# Patient Record
Sex: FEMALE | Race: OTHER | NOT HISPANIC OR LATINO | URBAN - METROPOLITAN AREA
[De-identification: names, ages, dates, MRNs, and addresses within clinical notes are randomized per-mention and may not be internally consistent; named-entity substitution may affect disease eponyms.]

---

## 2017-01-15 ENCOUNTER — EMERGENCY (EMERGENCY)
Facility: HOSPITAL | Age: 22
LOS: 1 days | Discharge: HOME | End: 2017-01-15
Admitting: EMERGENCY MEDICINE
Payer: MEDICAID

## 2017-01-15 VITALS
OXYGEN SATURATION: 100 % | RESPIRATION RATE: 16 BRPM | TEMPERATURE: 98 F | DIASTOLIC BLOOD PRESSURE: 51 MMHG | SYSTOLIC BLOOD PRESSURE: 109 MMHG | HEART RATE: 64 BPM

## 2017-01-15 VITALS
OXYGEN SATURATION: 100 % | DIASTOLIC BLOOD PRESSURE: 63 MMHG | RESPIRATION RATE: 16 BRPM | SYSTOLIC BLOOD PRESSURE: 103 MMHG | HEART RATE: 67 BPM | TEMPERATURE: 98 F

## 2017-01-15 LAB
ALBUMIN SERPL ELPH-MCNC: 4.5 G/DL — SIGNIFICANT CHANGE UP (ref 3.3–5)
ALP SERPL-CCNC: 42 U/L — SIGNIFICANT CHANGE UP (ref 40–120)
ALT FLD-CCNC: 46 U/L — HIGH (ref 4–33)
APPEARANCE UR: CLEAR — SIGNIFICANT CHANGE UP
AST SERPL-CCNC: 32 U/L — SIGNIFICANT CHANGE UP (ref 4–32)
BASOPHILS # BLD AUTO: 0.02 K/UL — SIGNIFICANT CHANGE UP (ref 0–0.2)
BASOPHILS NFR BLD AUTO: 0.2 % — SIGNIFICANT CHANGE UP (ref 0–2)
BILIRUB SERPL-MCNC: 0.5 MG/DL — SIGNIFICANT CHANGE UP (ref 0.2–1.2)
BILIRUB UR-MCNC: NEGATIVE — SIGNIFICANT CHANGE UP
BLOOD UR QL VISUAL: NEGATIVE — SIGNIFICANT CHANGE UP
BUN SERPL-MCNC: 14 MG/DL — SIGNIFICANT CHANGE UP (ref 7–23)
CALCIUM SERPL-MCNC: 9.5 MG/DL — SIGNIFICANT CHANGE UP (ref 8.4–10.5)
CHLORIDE SERPL-SCNC: 103 MMOL/L — SIGNIFICANT CHANGE UP (ref 98–107)
CO2 SERPL-SCNC: 21 MMOL/L — LOW (ref 22–31)
COLOR SPEC: YELLOW — SIGNIFICANT CHANGE UP
CREAT SERPL-MCNC: 0.71 MG/DL — SIGNIFICANT CHANGE UP (ref 0.5–1.3)
EOSINOPHIL # BLD AUTO: 0.01 K/UL — SIGNIFICANT CHANGE UP (ref 0–0.5)
EOSINOPHIL NFR BLD AUTO: 0.1 % — SIGNIFICANT CHANGE UP (ref 0–6)
GLUCOSE SERPL-MCNC: 93 MG/DL — SIGNIFICANT CHANGE UP (ref 70–99)
GLUCOSE UR-MCNC: NEGATIVE — SIGNIFICANT CHANGE UP
HCT VFR BLD CALC: 40.1 % — SIGNIFICANT CHANGE UP (ref 34.5–45)
HGB BLD-MCNC: 13.6 G/DL — SIGNIFICANT CHANGE UP (ref 11.5–15.5)
IMM GRANULOCYTES NFR BLD AUTO: 0.2 % — SIGNIFICANT CHANGE UP (ref 0–1.5)
KETONES UR-MCNC: NEGATIVE — SIGNIFICANT CHANGE UP
LEUKOCYTE ESTERASE UR-ACNC: NEGATIVE — SIGNIFICANT CHANGE UP
LYMPHOCYTES # BLD AUTO: 1.91 K/UL — SIGNIFICANT CHANGE UP (ref 1–3.3)
LYMPHOCYTES # BLD AUTO: 19.6 % — SIGNIFICANT CHANGE UP (ref 13–44)
MCHC RBC-ENTMCNC: 31 PG — SIGNIFICANT CHANGE UP (ref 27–34)
MCHC RBC-ENTMCNC: 33.9 % — SIGNIFICANT CHANGE UP (ref 32–36)
MCV RBC AUTO: 91.3 FL — SIGNIFICANT CHANGE UP (ref 80–100)
MONOCYTES # BLD AUTO: 0.32 K/UL — SIGNIFICANT CHANGE UP (ref 0–0.9)
MONOCYTES NFR BLD AUTO: 3.3 % — SIGNIFICANT CHANGE UP (ref 2–14)
NEUTROPHILS # BLD AUTO: 7.48 K/UL — HIGH (ref 1.8–7.4)
NEUTROPHILS NFR BLD AUTO: 76.6 % — SIGNIFICANT CHANGE UP (ref 43–77)
NITRITE UR-MCNC: NEGATIVE — SIGNIFICANT CHANGE UP
PH UR: 8 — SIGNIFICANT CHANGE UP (ref 4.6–8)
PLATELET # BLD AUTO: 234 K/UL — SIGNIFICANT CHANGE UP (ref 150–400)
PMV BLD: 11.9 FL — SIGNIFICANT CHANGE UP (ref 7–13)
POTASSIUM SERPL-MCNC: 4.3 MMOL/L — SIGNIFICANT CHANGE UP (ref 3.5–5.3)
POTASSIUM SERPL-SCNC: 4.3 MMOL/L — SIGNIFICANT CHANGE UP (ref 3.5–5.3)
PROT SERPL-MCNC: 8 G/DL — SIGNIFICANT CHANGE UP (ref 6–8.3)
PROT UR-MCNC: 20 — SIGNIFICANT CHANGE UP
RBC # BLD: 4.39 M/UL — SIGNIFICANT CHANGE UP (ref 3.8–5.2)
RBC # FLD: 12.4 % — SIGNIFICANT CHANGE UP (ref 10.3–14.5)
SODIUM SERPL-SCNC: 139 MMOL/L — SIGNIFICANT CHANGE UP (ref 135–145)
SP GR SPEC: 1.02 — SIGNIFICANT CHANGE UP (ref 1–1.03)
UROBILINOGEN FLD QL: NORMAL E.U. — SIGNIFICANT CHANGE UP (ref 0.1–0.2)
WBC # BLD: 9.76 K/UL — SIGNIFICANT CHANGE UP (ref 3.8–10.5)
WBC # FLD AUTO: 9.76 K/UL — SIGNIFICANT CHANGE UP (ref 3.8–10.5)
WBC UR QL: SIGNIFICANT CHANGE UP (ref 0–?)

## 2017-01-15 PROCEDURE — 99284 EMERGENCY DEPT VISIT MOD MDM: CPT

## 2017-01-15 RX ORDER — SODIUM CHLORIDE 9 MG/ML
1000 INJECTION INTRAMUSCULAR; INTRAVENOUS; SUBCUTANEOUS ONCE
Qty: 0 | Refills: 0 | Status: COMPLETED | OUTPATIENT
Start: 2017-01-15 | End: 2017-01-15

## 2017-01-15 RX ORDER — ONDANSETRON 8 MG/1
4 TABLET, FILM COATED ORAL ONCE
Qty: 0 | Refills: 0 | Status: COMPLETED | OUTPATIENT
Start: 2017-01-15 | End: 2017-01-15

## 2017-01-15 RX ORDER — FAMOTIDINE 10 MG/ML
20 INJECTION INTRAVENOUS ONCE
Qty: 0 | Refills: 0 | Status: COMPLETED | OUTPATIENT
Start: 2017-01-15 | End: 2017-01-15

## 2017-01-15 RX ORDER — ONDANSETRON 8 MG/1
1 TABLET, FILM COATED ORAL
Qty: 12 | Refills: 0 | OUTPATIENT
Start: 2017-01-15 | End: 2017-01-18

## 2017-01-15 RX ADMIN — SODIUM CHLORIDE 2000 MILLILITER(S): 9 INJECTION INTRAMUSCULAR; INTRAVENOUS; SUBCUTANEOUS at 15:40

## 2017-01-15 RX ADMIN — FAMOTIDINE 20 MILLIGRAM(S): 10 INJECTION INTRAVENOUS at 15:43

## 2017-01-15 RX ADMIN — ONDANSETRON 4 MILLIGRAM(S): 8 TABLET, FILM COATED ORAL at 15:43

## 2017-01-15 NOTE — ED ADULT NURSE REASSESSMENT NOTE - NS ED NURSE REASSESS COMMENT FT1
Patient returned back to board, states she was in bathroom when she was being called.  Patient was a no answer at [id number], and [phone]

## 2017-01-15 NOTE — ED PROVIDER NOTE - CARE PLAN
Principal Discharge DX:	Gastroenteritis  Instructions for follow-up, activity and diet:	Zofran for nausea as directed 30 min before meals  Drink plenty of fluids and stay hydrated  Follow up with your doctor in the next 24-48 hours   If you develop worsening or emergent concerns as discussed return to the ER

## 2017-01-15 NOTE — ED PROVIDER NOTE - OBJECTIVE STATEMENT
20 yo F p/w nausea, vomiting and diarrhea x 1 day with 20 yo F p/w nausea, vomiting and diarrhea x 1 day without some mid abdominal burning s/p vomiting. Patient works in a [employer] and exposed to many people a day. Woke up this morning unable to tolerate any PO solids or liquids. Vomit non bloody/non bilious x 7 episodes, diarrhea, non bloody x 6 episodes. No known sick contacts. Denies fever, chills, throat/ear pain, chest pain, SOB, dysuria/frequency or other urinary symptoms, back pain. Appears well with boyfriend at bedside. LMP: 2 weeks ago, denies pregnancy

## 2017-01-15 NOTE — ED PROVIDER NOTE - PROGRESS NOTE DETAILS
CHRIS Montes De Oca: PO challenge successful without nausea, vomiting or pain, no BM in ED. patient feels better and wants to be dc

## 2017-01-15 NOTE — ED PROVIDER NOTE - PLAN OF CARE
Zofran for nausea as directed 30 min before meals  Drink plenty of fluids and stay hydrated  Follow up with your doctor in the next 24-48 hours   If you develop worsening or emergent concerns as discussed return to the ER

## 2018-04-21 ENCOUNTER — EMERGENCY (EMERGENCY)
Facility: HOSPITAL | Age: 23
LOS: 1 days | Discharge: HOME | End: 2018-04-21
Attending: EMERGENCY MEDICINE | Admitting: EMERGENCY MEDICINE
Payer: MEDICAID

## 2018-04-21 VITALS
TEMPERATURE: 98 F | DIASTOLIC BLOOD PRESSURE: 70 MMHG | SYSTOLIC BLOOD PRESSURE: 120 MMHG | OXYGEN SATURATION: 100 % | RESPIRATION RATE: 16 BRPM | HEART RATE: 79 BPM

## 2018-04-21 LAB
ALBUMIN SERPL ELPH-MCNC: 4.5 G/DL — SIGNIFICANT CHANGE UP (ref 3.3–5)
ALP SERPL-CCNC: 59 U/L — SIGNIFICANT CHANGE UP (ref 40–120)
ALT FLD-CCNC: 22 U/L — SIGNIFICANT CHANGE UP (ref 4–33)
APPEARANCE UR: CLEAR — SIGNIFICANT CHANGE UP
AST SERPL-CCNC: 19 U/L — SIGNIFICANT CHANGE UP (ref 4–32)
BASOPHILS # BLD AUTO: 0.04 K/UL — SIGNIFICANT CHANGE UP (ref 0–0.2)
BASOPHILS NFR BLD AUTO: 0.3 % — SIGNIFICANT CHANGE UP (ref 0–2)
BILIRUB SERPL-MCNC: 0.5 MG/DL — SIGNIFICANT CHANGE UP (ref 0.2–1.2)
BILIRUB UR-MCNC: NEGATIVE — SIGNIFICANT CHANGE UP
BLOOD UR QL VISUAL: HIGH
BUN SERPL-MCNC: 11 MG/DL — SIGNIFICANT CHANGE UP (ref 7–23)
CALCIUM SERPL-MCNC: 9.7 MG/DL — SIGNIFICANT CHANGE UP (ref 8.4–10.5)
CHLORIDE SERPL-SCNC: 102 MMOL/L — SIGNIFICANT CHANGE UP (ref 98–107)
CO2 SERPL-SCNC: 24 MMOL/L — SIGNIFICANT CHANGE UP (ref 22–31)
COLOR SPEC: YELLOW — SIGNIFICANT CHANGE UP
CREAT SERPL-MCNC: 0.66 MG/DL — SIGNIFICANT CHANGE UP (ref 0.5–1.3)
EOSINOPHIL # BLD AUTO: 0 K/UL — SIGNIFICANT CHANGE UP (ref 0–0.5)
EOSINOPHIL NFR BLD AUTO: 0 % — SIGNIFICANT CHANGE UP (ref 0–6)
GLUCOSE SERPL-MCNC: 104 MG/DL — HIGH (ref 70–99)
GLUCOSE UR-MCNC: NEGATIVE — SIGNIFICANT CHANGE UP
HCG SERPL-ACNC: < 5 MIU/ML — SIGNIFICANT CHANGE UP
HCT VFR BLD CALC: 38.9 % — SIGNIFICANT CHANGE UP (ref 34.5–45)
HGB BLD-MCNC: 12.8 G/DL — SIGNIFICANT CHANGE UP (ref 11.5–15.5)
IMM GRANULOCYTES # BLD AUTO: 0.05 # — SIGNIFICANT CHANGE UP
IMM GRANULOCYTES NFR BLD AUTO: 0.4 % — SIGNIFICANT CHANGE UP (ref 0–1.5)
KETONES UR-MCNC: SIGNIFICANT CHANGE UP
LEUKOCYTE ESTERASE UR-ACNC: NEGATIVE — SIGNIFICANT CHANGE UP
LYMPHOCYTES # BLD AUTO: 1.62 K/UL — SIGNIFICANT CHANGE UP (ref 1–3.3)
LYMPHOCYTES # BLD AUTO: 12.9 % — LOW (ref 13–44)
MCHC RBC-ENTMCNC: 30.3 PG — SIGNIFICANT CHANGE UP (ref 27–34)
MCHC RBC-ENTMCNC: 32.9 % — SIGNIFICANT CHANGE UP (ref 32–36)
MCV RBC AUTO: 92 FL — SIGNIFICANT CHANGE UP (ref 80–100)
MONOCYTES # BLD AUTO: 0.49 K/UL — SIGNIFICANT CHANGE UP (ref 0–0.9)
MONOCYTES NFR BLD AUTO: 3.9 % — SIGNIFICANT CHANGE UP (ref 2–14)
MUCOUS THREADS # UR AUTO: SIGNIFICANT CHANGE UP
NEUTROPHILS # BLD AUTO: 10.31 K/UL — HIGH (ref 1.8–7.4)
NEUTROPHILS NFR BLD AUTO: 82.5 % — HIGH (ref 43–77)
NITRITE UR-MCNC: NEGATIVE — SIGNIFICANT CHANGE UP
NRBC # FLD: 0 — SIGNIFICANT CHANGE UP
PH UR: 8 — SIGNIFICANT CHANGE UP (ref 4.6–8)
PLATELET # BLD AUTO: 260 K/UL — SIGNIFICANT CHANGE UP (ref 150–400)
PMV BLD: 11.9 FL — SIGNIFICANT CHANGE UP (ref 7–13)
POTASSIUM SERPL-MCNC: 4.2 MMOL/L — SIGNIFICANT CHANGE UP (ref 3.5–5.3)
POTASSIUM SERPL-SCNC: 4.2 MMOL/L — SIGNIFICANT CHANGE UP (ref 3.5–5.3)
PROT SERPL-MCNC: 8.2 G/DL — SIGNIFICANT CHANGE UP (ref 6–8.3)
PROT UR-MCNC: 100 MG/DL — HIGH
RBC # BLD: 4.23 M/UL — SIGNIFICANT CHANGE UP (ref 3.8–5.2)
RBC # FLD: 12.3 % — SIGNIFICANT CHANGE UP (ref 10.3–14.5)
RBC CASTS # UR COMP ASSIST: HIGH (ref 0–?)
SODIUM SERPL-SCNC: 139 MMOL/L — SIGNIFICANT CHANGE UP (ref 135–145)
SP GR SPEC: 1.02 — SIGNIFICANT CHANGE UP (ref 1–1.04)
SQUAMOUS # UR AUTO: SIGNIFICANT CHANGE UP
TSH SERPL-MCNC: 0.37 UIU/ML — SIGNIFICANT CHANGE UP (ref 0.27–4.2)
UROBILINOGEN FLD QL: NORMAL MG/DL — SIGNIFICANT CHANGE UP
WBC # BLD: 12.51 K/UL — HIGH (ref 3.8–10.5)
WBC # FLD AUTO: 12.51 K/UL — HIGH (ref 3.8–10.5)
WBC UR QL: SIGNIFICANT CHANGE UP (ref 0–?)

## 2018-04-21 PROCEDURE — 99284 EMERGENCY DEPT VISIT MOD MDM: CPT

## 2018-04-21 RX ORDER — METOCLOPRAMIDE HCL 10 MG
10 TABLET ORAL ONCE
Qty: 0 | Refills: 0 | Status: COMPLETED | OUTPATIENT
Start: 2018-04-21 | End: 2018-04-21

## 2018-04-21 RX ORDER — METOCLOPRAMIDE HCL 10 MG
1 TABLET ORAL
Qty: 12 | Refills: 0 | OUTPATIENT
Start: 2018-04-21 | End: 2018-04-23

## 2018-04-21 RX ORDER — SODIUM CHLORIDE 9 MG/ML
1000 INJECTION INTRAMUSCULAR; INTRAVENOUS; SUBCUTANEOUS ONCE
Qty: 0 | Refills: 0 | Status: COMPLETED | OUTPATIENT
Start: 2018-04-21 | End: 2018-04-21

## 2018-04-21 RX ORDER — ACETAMINOPHEN 500 MG
650 TABLET ORAL ONCE
Qty: 0 | Refills: 0 | Status: COMPLETED | OUTPATIENT
Start: 2018-04-21 | End: 2018-04-21

## 2018-04-21 RX ADMIN — SODIUM CHLORIDE 1000 MILLILITER(S): 9 INJECTION INTRAMUSCULAR; INTRAVENOUS; SUBCUTANEOUS at 18:44

## 2018-04-21 RX ADMIN — Medication 650 MILLIGRAM(S): at 18:44

## 2018-04-21 RX ADMIN — Medication 10 MILLIGRAM(S): at 18:44

## 2018-04-21 NOTE — ED PROVIDER NOTE - OBJECTIVE STATEMENT
22F p/w posterior headache, woke up with it, it improved throughout the day.  Pt later developed nausea and vomiting, with some blood in it.  Tried drinking water and eating rice, vomited it.  USOH yesterday.  Pt having her period now.  Pt didn't really sleep last night, came home late, but she was "sober".  Pt also had a HA last week.  Feels similar to "migraines", does not carry a dx of migraines.  No photophobia.  No neck pain.  Notes some abd discomfort as well.  No fever.  No dysuria.  No diarrhea.    PMHX remote hx of sz (last 2006)  PSHX bilat breast biopsies.    no T 22F p/w posterior headache, woke up with it, it improved throughout the day.  Pt later developed nausea and vomiting, with some blood in it.  Tried drinking water and eating rice, vomited it.  USOH yesterday.  Pt having her period now.  Pt didn't really sleep last night, came home late, but she was "sober".  Pt also had a HA last week.  Feels similar to "migraines", does not carry a dx of migraines.  No photophobia.  No neck pain.  Notes some abd discomfort as well.  No fever.  No dysuria.  No diarrhea.  Will check pregnancy test.  Well appearing, low concern for ICH or meningitis except for the vomiting.  Pt offered CT but declining at this point, will think about it.  Rx reglan and fluids, check labs, reassess.  Normal neuro exam.    PMHX remote hx of drew (last 2006)  PSHX bilat breast biopsies.    no T

## 2018-04-21 NOTE — ED PROVIDER NOTE - ATTENDING CONTRIBUTION TO CARE
22F p/w posterior headache, woke up with it, it improved throughout the day.  Pt later developed nausea and vomiting, with some blood in it.  Tried drinking water and eating rice, vomited it.  USOH yesterday.  Pt having her period now.  Pt didn't really sleep last night, came home late, but she was "sober".  Pt also had a HA last week.  Feels similar to "migraines", does not carry a dx of migraines.  No photophobia.  No neck pain.  Notes some abd discomfort as well.  No fever.  No dysuria.  No diarrhea.  Will check pregnancy test.  Well appearing, low concern for ICH or meningitis except for the vomiting.  Pt offered CT but declining at this point, will think about it.  Rx reglan and fluids, check labs, reassess.  Normal neuro exam.    PMHX remote hx of sz (last 2006)  PSHX bilat breast biopsies.    no T  VS:  unremarkable    GEN - NAD; well appearing; A+O x3   HEAD - NC/AT     ENT - PEERL, EOMI, mucous membranes  moist , no discharge      NECK: Neck supple, non-tender without lymphadenopathy, no masses, no JVD  PULM - CTA b/l,  symmetric breath sounds  COR -  normal heart sounds    ABD - , ND, NT, soft, no guarding, no rebound, no masses    BACK - no CVA tenderness, nontender spine     EXTREMS - no edema, no deformity, warm and well perfused    SKIN - no rash or bruising      NEUROLOGIC - alert, CN 2-12 intact, sensation nl, motor 5/5 RUE/LUE/RLE/LLE.

## 2018-04-21 NOTE — ED PROVIDER NOTE - PLAN OF CARE
Please follow-up with your primary care doctor in the next 24-48 hours   Please follow-up with your neurologist in the next 1-2 weeks   If you have any severe headache with fever, are unable to move your neck or have changes in vison please return to the emergency department

## 2018-04-21 NOTE — ED PROVIDER NOTE - CARE PLAN
Assessment and plan of treatment:	Please follow-up with your primary care doctor in the next 24-48 hours   Please follow-up with your neurologist in the next 1-2 weeks   If you have any severe headache with fever, are unable to move your neck or have changes in vison please return to the emergency department Principal Discharge DX:	Migraine  Assessment and plan of treatment:	Please follow-up with your primary care doctor in the next 24-48 hours   Please follow-up with your neurologist in the next 1-2 weeks   If you have any severe headache with fever, are unable to move your neck or have changes in vison please return to the emergency department

## 2018-04-21 NOTE — ED PROVIDER NOTE - PHYSICAL EXAMINATION
General: well appearing female in no acute distress   HEENT: no nuchal rigidity, EOMI  Respiratory: normal work of breathing, lungs clear to auscultation bilaterally   Cardiac: regular rate and rhythm   Abdomen: soft, non-tender   Neuro: A&Ox3, cranial nerves II-XII intact, 5/5 strength in all extremities, no sensory deficits

## 2018-04-21 NOTE — ED PROVIDER NOTE - MEDICAL DECISION MAKING DETAILS
22F, PMH of seizure presenting with headache, nausea and vomiting. had similar episode two weeks ago. low concern for meningitis, intracranial hemorrhage 2/2 previous episode, afebrile, no nuchal rigidity, well appearing. concern for tension vs migraine headache. plan for cbc, cmp, ua, hcg, reglan, tylenol. will reassess need for CT.

## 2018-04-21 NOTE — ED ADULT TRIAGE NOTE - CHIEF COMPLAINT QUOTE
c/o of nausea, vomiting and having a headache since this morning.  Pt also c/o of abdominal  fullness.  PMH Seizure 2006.  no medication taken.

## 2018-07-08 ENCOUNTER — EMERGENCY (EMERGENCY)
Facility: HOSPITAL | Age: 23
LOS: 1 days | Discharge: HOME | End: 2018-07-08
Attending: EMERGENCY MEDICINE | Admitting: EMERGENCY MEDICINE
Payer: MEDICAID

## 2018-07-08 VITALS
SYSTOLIC BLOOD PRESSURE: 107 MMHG | RESPIRATION RATE: 16 BRPM | DIASTOLIC BLOOD PRESSURE: 59 MMHG | TEMPERATURE: 99 F | HEART RATE: 59 BPM | OXYGEN SATURATION: 100 %

## 2018-07-08 VITALS
TEMPERATURE: 98 F | OXYGEN SATURATION: 100 % | DIASTOLIC BLOOD PRESSURE: 67 MMHG | SYSTOLIC BLOOD PRESSURE: 122 MMHG | HEART RATE: 72 BPM | RESPIRATION RATE: 16 BRPM

## 2018-07-08 LAB
ALBUMIN SERPL ELPH-MCNC: 4.3 G/DL — SIGNIFICANT CHANGE UP (ref 3.3–5)
ALP SERPL-CCNC: 58 U/L — SIGNIFICANT CHANGE UP (ref 40–120)
ALT FLD-CCNC: 24 U/L — SIGNIFICANT CHANGE UP (ref 4–33)
AST SERPL-CCNC: 31 U/L — SIGNIFICANT CHANGE UP (ref 4–32)
BASOPHILS # BLD AUTO: 0.05 K/UL — SIGNIFICANT CHANGE UP (ref 0–0.2)
BASOPHILS NFR BLD AUTO: 0.5 % — SIGNIFICANT CHANGE UP (ref 0–2)
BILIRUB SERPL-MCNC: 0.3 MG/DL — SIGNIFICANT CHANGE UP (ref 0.2–1.2)
BUN SERPL-MCNC: 15 MG/DL — SIGNIFICANT CHANGE UP (ref 7–23)
CALCIUM SERPL-MCNC: 9.6 MG/DL — SIGNIFICANT CHANGE UP (ref 8.4–10.5)
CHLORIDE SERPL-SCNC: 103 MMOL/L — SIGNIFICANT CHANGE UP (ref 98–107)
CO2 SERPL-SCNC: 22 MMOL/L — SIGNIFICANT CHANGE UP (ref 22–31)
CREAT SERPL-MCNC: 0.8 MG/DL — SIGNIFICANT CHANGE UP (ref 0.5–1.3)
EOSINOPHIL # BLD AUTO: 0.12 K/UL — SIGNIFICANT CHANGE UP (ref 0–0.5)
EOSINOPHIL NFR BLD AUTO: 1.1 % — SIGNIFICANT CHANGE UP (ref 0–6)
GLUCOSE SERPL-MCNC: 75 MG/DL — SIGNIFICANT CHANGE UP (ref 70–99)
HCT VFR BLD CALC: 39.7 % — SIGNIFICANT CHANGE UP (ref 34.5–45)
HGB BLD-MCNC: 13.1 G/DL — SIGNIFICANT CHANGE UP (ref 11.5–15.5)
IMM GRANULOCYTES # BLD AUTO: 0.04 # — SIGNIFICANT CHANGE UP
IMM GRANULOCYTES NFR BLD AUTO: 0.4 % — SIGNIFICANT CHANGE UP (ref 0–1.5)
LYMPHOCYTES # BLD AUTO: 3.44 K/UL — HIGH (ref 1–3.3)
LYMPHOCYTES # BLD AUTO: 32.9 % — SIGNIFICANT CHANGE UP (ref 13–44)
MAGNESIUM SERPL-MCNC: 2.1 MG/DL — SIGNIFICANT CHANGE UP (ref 1.6–2.6)
MCHC RBC-ENTMCNC: 29.9 PG — SIGNIFICANT CHANGE UP (ref 27–34)
MCHC RBC-ENTMCNC: 33 % — SIGNIFICANT CHANGE UP (ref 32–36)
MCV RBC AUTO: 90.6 FL — SIGNIFICANT CHANGE UP (ref 80–100)
MONOCYTES # BLD AUTO: 0.76 K/UL — SIGNIFICANT CHANGE UP (ref 0–0.9)
MONOCYTES NFR BLD AUTO: 7.3 % — SIGNIFICANT CHANGE UP (ref 2–14)
NEUTROPHILS # BLD AUTO: 6.04 K/UL — SIGNIFICANT CHANGE UP (ref 1.8–7.4)
NEUTROPHILS NFR BLD AUTO: 57.8 % — SIGNIFICANT CHANGE UP (ref 43–77)
NRBC # FLD: 0 — SIGNIFICANT CHANGE UP
PHOSPHATE SERPL-MCNC: 3.2 MG/DL — SIGNIFICANT CHANGE UP (ref 2.5–4.5)
PLATELET # BLD AUTO: 248 K/UL — SIGNIFICANT CHANGE UP (ref 150–400)
PMV BLD: 11.9 FL — SIGNIFICANT CHANGE UP (ref 7–13)
POTASSIUM SERPL-MCNC: 4.7 MMOL/L — SIGNIFICANT CHANGE UP (ref 3.5–5.3)
POTASSIUM SERPL-SCNC: 4.7 MMOL/L — SIGNIFICANT CHANGE UP (ref 3.5–5.3)
PROT SERPL-MCNC: 8.1 G/DL — SIGNIFICANT CHANGE UP (ref 6–8.3)
RBC # BLD: 4.38 M/UL — SIGNIFICANT CHANGE UP (ref 3.8–5.2)
RBC # FLD: 12.6 % — SIGNIFICANT CHANGE UP (ref 10.3–14.5)
SODIUM SERPL-SCNC: 137 MMOL/L — SIGNIFICANT CHANGE UP (ref 135–145)
TSH SERPL-MCNC: 0.77 UIU/ML — SIGNIFICANT CHANGE UP (ref 0.27–4.2)
VIT B12 SERPL-MCNC: 1307 PG/ML — HIGH (ref 200–900)
WBC # BLD: 10.45 K/UL — SIGNIFICANT CHANGE UP (ref 3.8–10.5)
WBC # FLD AUTO: 10.45 K/UL — SIGNIFICANT CHANGE UP (ref 3.8–10.5)

## 2018-07-08 PROCEDURE — 70553 MRI BRAIN STEM W/O & W/DYE: CPT | Mod: 26

## 2018-07-08 PROCEDURE — 99285 EMERGENCY DEPT VISIT HI MDM: CPT

## 2018-07-08 RX ORDER — METOCLOPRAMIDE HCL 10 MG
10 TABLET ORAL ONCE
Qty: 0 | Refills: 0 | Status: COMPLETED | OUTPATIENT
Start: 2018-07-08 | End: 2018-07-08

## 2018-07-08 RX ORDER — SODIUM CHLORIDE 9 MG/ML
1000 INJECTION INTRAMUSCULAR; INTRAVENOUS; SUBCUTANEOUS ONCE
Qty: 0 | Refills: 0 | Status: COMPLETED | OUTPATIENT
Start: 2018-07-08 | End: 2018-07-08

## 2018-07-08 RX ORDER — ACETAMINOPHEN 500 MG
1000 TABLET ORAL ONCE
Qty: 0 | Refills: 0 | Status: COMPLETED | OUTPATIENT
Start: 2018-07-08 | End: 2018-07-08

## 2018-07-08 RX ADMIN — Medication 10 MILLIGRAM(S): at 15:26

## 2018-07-08 RX ADMIN — Medication 400 MILLIGRAM(S): at 15:27

## 2018-07-08 RX ADMIN — SODIUM CHLORIDE 1000 MILLILITER(S): 9 INJECTION INTRAMUSCULAR; INTRAVENOUS; SUBCUTANEOUS at 15:26

## 2018-07-08 NOTE — ED ADULT NURSE REASSESSMENT NOTE - SYMPTOMS
dizzyness resolved, states she still has some "pressure' in occipital area of head but denies pain. vs as documented

## 2018-07-08 NOTE — ED PROVIDER NOTE - PLAN OF CARE
Follow-up with Neurology and PMD You were seen at [facility] and had normal lab work and an MRI which showed......You are to follow-up with Neurology and your Primary care doctor for further management. You should take the medications as prescribed. You should return for any worsening symptoms or one sided paralysis.

## 2018-07-08 NOTE — ED PROVIDER NOTE - MEDICAL DECISION MAKING DETAILS
Ania: H/o seizures, 3 months dizziness, pre-syncope, HA (worse in AM): HA, then nausea, then pre-syncope. Recent head CT: cortical atrophy. Increasing frequency. Tandem gait and walking on toes unstable. Plan: CDU for MRI and Neuro. Pt is a 21 y/o F PMHx of childhood seizures who p/w worsening headaches, N/V, and new gait instability s/p outpt CT scan concerning for possible central lesions not seen on CT vs atypical migraines vs tension headaches. Will do cbc, CMP, TSH, B12, give IVFs, Regaln, tylenol, consult neuro, and patient likely CDU for MRI Brain.   Ania: H/o seizures, 3 months dizziness, pre-syncope, HA (worse in AM): HA, then nausea, then pre-syncope. Recent head CT: cortical atrophy. Increasing frequency. Tandem gait and walking on toes unstable. Plan: CDU for MRI and Neuro.

## 2018-07-08 NOTE — ED PROVIDER NOTE - CRANIAL NERVE AND PUPILLARY EXAM
Rhomberg normal. Heel-to-toe and tip-toe gait with instability and deviation to left, normal 5/5 strength in b/l upper and lower Es, rhomberg normal and no pronator drift.//cranial nerves 2-12 intact

## 2018-07-08 NOTE — ED ADULT NURSE NOTE - CHIEF COMPLAINT QUOTE
pressure to back of head,dizziness,nausea   xpast 2 mos. seen ed, 1 mo ago,followed up at md at [facility],ct scan done,results   mild diffse cortical atrophy with slight prominence suba spaces. reports dizziness yesterday,lightheaed,vomited  fs 99

## 2018-07-08 NOTE — ED ADULT TRIAGE NOTE - CHIEF COMPLAINT QUOTE
pressure to back of head,dizziness,nausea   xpast 2 mos. seen ed, 1 mo ago,followed up at md at [facility],ct scan done,results   mild diffse cortical atrophy with slight prominence suba spaces. reports dizziness yesterday,lightheaed,vomited pressure to back of head,dizziness,nausea   xpast 2 mos. seen ed, 1 mo ago,followed up at md at [facility],ct scan done,results   mild diffse cortical atrophy with slight prominence suba spaces. reports dizziness yesterday,lightheaed,vomited  fs 99

## 2018-07-08 NOTE — CONSULT NOTE ADULT - SUBJECTIVE AND OBJECTIVE BOX
HPI:  Patient is a 22 year old female with PMHx of childhood seizures (resolved at age 9) who presents with nausea and headache that has been getting worse for several days. 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 She has had occipital HA for the past week associated with nausea, dizziness, blurred vision but no double vision. She denies any numbness, pain, or weakness, and is only dizzy when she is standing, not dizzy when sitting or supine. Denies fevers chills, chest pain, confusion. No new seizures.     PAST MEDICAL & SURGICAL HISTORY:  Seizure  No significant past surgical history    FAMILY HISTORY:  No pertinent family history in first degree relatives    Allergies  No Known Allergies    SHx - No smoking, No ETOH, No drug abuse    Review of Systems:  CONSTITUTIONAL:  No weight loss, fever, chills, weakness or fatigue.  HEENT:  Eyes:  + for blurry vision (intermittent, no yellow sclerae. Ears, Nose, Throat:  No hearing loss, sneezing, congestion, runny nose or sore throat.  CARDIOVASCULAR:  No chest pain, chest pressure or chest discomfort. No palpitations or edema.  GASTROINTESTINAL:  No anorexia, nausea, vomiting or diarrhea. No abdominal pain or blood.  NEUROLOGICAL: See HPI  MUSCULOSKELETAL:  No muscle, back pain, joint pain or stiffness.  PSYCHIATRIC:  No history of depression or anxiety.    Vital Signs Last 24 Hrs  T(C): 36.9 (08 Jul 2018 13:05), Max: 36.9 (08 Jul 2018 13:05)  T(F): 98.4 (08 Jul 2018 13:05), Max: 98.4 (08 Jul 2018 13:05)  HR: 72 (08 Jul 2018 13:05) (72 - 72)  BP: 122/67 (08 Jul 2018 13:05) (122/67 - 122/67)  BP(mean): --  RR: 16 (08 Jul 2018 13:05) (16 - 16)  SpO2: 100% (08 Jul 2018 13:05) (100% - 100%)    General Exam:   General appearance: No acute distress                 Neurological Exam:  Mental Status: Orientated to self, date and place.    No dysarthria, aphasia or neglect.      Cranial Nerves: CN I - not tested.  PERRL, EOMI, VFF, no nystagmus or diplopia.  No APD.    Fundi not visualized bilaterally.    No facial asymmetry.  Hearing intact to finger rub bilaterally.     Motor:   Tone: normal.                  Strength:     [] Upper extremity                      Delt       Bicep    Tricep                                                  R         5/5        5/5        5/5       5/5                                               L          5/5        5/5        5/5       5/5  [] Lower extremity                       HF          KE          KF        DF         PF                                               R        5/5        5/5        5/5       5/5       5/5                                               L         5/5        5/5       5/5       5/5        5/5  Pronator drift: none                 Dysmetria: BL NL FTN  No truncal ataxia. able to walk on toes, heel to shin to heel intact bilaterally, able to walk heel to toe and backwards. romberg negative.   Tremor: No resting, postural or action tremor.  No myoclonus.    Sensation: intact to light touch, vibration and proprioception    Deep Tendon Reflexes:   Right 2+ Knee, 1+ ankle  Left 2+ Knee, 1+ ankle    Toes flexor bilaterally  Gait: normal and stable.      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

## 2018-07-08 NOTE — ED PROVIDER NOTE - OBJECTIVE STATEMENT
Pt is a 21 y/o F w/ PMHx of Seizures as a child who p/w headaches, n/v, and presyncope that has been getting worse over past week. Pt states that was sen here about one month about had an ekg which was normal and was discharged home with outpt f/u. Pt had outpt CT which showed diffuse cortical atropy and widening of the subarachnoid space and has f/u with PMD on July 21st. Pt noted over past week headache is posterior and throbbing associated with n/v and feeling as if she is marcella pass out and some blurred vision but no double vision. No one sided paralysis, states difficulty with ambulation and feels as if she is intoxicated when ambulation.     ROS: Denies fevers, chills, CP, Abdominal pain, rhinorrhea, new rashes, new LE edema, new visual changes, confusion, headaches, blood in stools, dysuria, and hematuria, states unlikely to be pregnant.

## 2018-07-08 NOTE — ED PROVIDER NOTE - CHIEF COMPLAINT
The patient is a 22y Female complaining of The patient is a 22y Female complaining of dizziness and headache

## 2018-07-08 NOTE — ED PROVIDER NOTE - PROGRESS NOTE DETAILS
MRI done. Spoke to RADs Resident and will work on getting official read for disposition. Discussed with patient and signed out to ER resident Eb Goetz.   -Mohamud Carpio PGY4 EMIM Pager#[phone]/Spectra#[phone] Attending Note (Ludy): patient signed out pending mri.  prelim results negative.  dc with neuro follow up.

## 2018-07-08 NOTE — ED PROVIDER NOTE - CARE PLAN
Principal Discharge DX:	Dizziness Principal Discharge DX:	Dizziness  Goal:	Follow-up with Neurology and PMD  Assessment and plan of treatment:	You were seen at [facility] ER and had normal lab work and an MRI which showed......You are to follow-up with Neurology and your Primary care doctor for further management. You should take the medications as prescribed. You should return for any worsening symptoms or one sided paralysis.

## 2018-07-08 NOTE — ED ADULT NURSE NOTE - OBJECTIVE STATEMENT
Pt received in intake spot 4. Alert and oriented x3, co headache, dizziness x2 months. Denies n/v/d, fevers, chills. Respirations even and non labored. No swelling. Denies cp, sob. labs sent. IV placed. Meds given as per MD orders. Will monitor.

## 2018-07-08 NOTE — CONSULT NOTE ADULT - ASSESSMENT
Patient is a 22 year old female with PMHx of seizure disorder in childhood who presents with dizziness, blurred vision and occipital headache for the past week. She has had these symptoms on and off since mid April  CT head from outside hospital reviewed. These headaches are likely migraine-related.     Plan  MRI Brain with contrast  Can consider migraine cocktail (reglan, fluids, magnesium, toradol 30 mg)    Thank you for the consult

## 2018-07-08 NOTE — ED PROVIDER NOTE - ATTENDING CONTRIBUTION TO CARE
I performed a face-to-face evaluation of the patient and performed a history and physical examination. I agree with the history and physical examination.    Ania: H/o seizures, 3 months dizziness, pre-syncope, HA (worse in AM): HA, then nausea, then pre-syncope. Recent head CT: cortical atrophy. Increasing frequency. Tandem gait and walking on toes unstable. Plan: CDU for MRI and Neuro.

## 2019-02-14 ENCOUNTER — EMERGENCY (EMERGENCY)
Facility: HOSPITAL | Age: 24
LOS: 1 days | Discharge: HOME | End: 2019-02-14
Attending: EMERGENCY MEDICINE | Admitting: EMERGENCY MEDICINE
Payer: MEDICAID

## 2019-02-14 VITALS
OXYGEN SATURATION: 100 % | TEMPERATURE: 98 F | SYSTOLIC BLOOD PRESSURE: 121 MMHG | DIASTOLIC BLOOD PRESSURE: 78 MMHG | RESPIRATION RATE: 16 BRPM | HEART RATE: 63 BPM

## 2019-02-14 PROCEDURE — 99283 EMERGENCY DEPT VISIT LOW MDM: CPT

## 2019-02-14 RX ORDER — IBUPROFEN 200 MG
600 TABLET ORAL ONCE
Qty: 0 | Refills: 0 | Status: COMPLETED | OUTPATIENT
Start: 2019-02-14 | End: 2019-02-14

## 2019-02-14 RX ORDER — ACETAMINOPHEN 500 MG
975 TABLET ORAL ONCE
Qty: 0 | Refills: 0 | Status: COMPLETED | OUTPATIENT
Start: 2019-02-14 | End: 2019-02-14

## 2019-02-14 RX ADMIN — Medication 975 MILLIGRAM(S): at 16:35

## 2019-02-14 RX ADMIN — Medication 600 MILLIGRAM(S): at 16:35

## 2019-02-14 NOTE — ED PROVIDER NOTE - ATTENDING CONTRIBUTION TO CARE
I performed a face-to-face evaluation of the patient and performed a history and physical examination. I agree with the history and physical examination.    Young F, pain in teeth throughout mouth, migrating, grits teeth at night. No TMJ clicking. No abscess/infection clinically or by xray at dentist. Pain control,  for teeth.

## 2019-02-14 NOTE — ED PROVIDER NOTE - OBJECTIVE STATEMENT
23 year-old female w/ no pertinent past medical history presents to the ED for dental pain x 5 days; mostly localized to the right lower jaw.  Described as an electric/sharp sensation worsened by chewing.  No fevers, chills, nausea, vomiting, chest pain, shortness of breath, trouble swallowing.  No trauma reported.  Did not take any pain medications at home.  Followed up with her dentist this AM; no acute findings and told to follow-up with an oral surgeon for further eval.

## 2019-02-14 NOTE — ED ADULT TRIAGE NOTE - CHIEF COMPLAINT QUOTE
pt amb to triage c/o L side top jaw pain increased w/ chewing, states now on R upper jaw associated w/ R ear pain, denies fever, chills, drainage, or swelling, no resp distress noted, able to complete sentences

## 2019-02-14 NOTE — ED PROVIDER NOTE - PHYSICAL EXAMINATION
*Gen: NAD, AAO*3  *HEENT: NC/AT, MMM, no tooth abscess noted, airway patent, trachea midline  *CV: RRR, S1/S2 present, no murmurs/rubs/gallops  *Resp: no respiratory distress, LCTAB, no wheezing/rales/rhonchi  *Abd: non-distended, soft N/Tx4, no guarding or rigidity  *Neuro: no focal neuro deficits, moving all limbs appropriately  *Skin: no rashes, no wounds   ~ Deana Santana M.D.

## 2019-02-14 NOTE — ED PROVIDER NOTE - CLINICAL SUMMARY MEDICAL DECISION MAKING FREE TEXT BOX
23 year-old female w/ no pertinent past medical history presents to the ED for dental pain x 5 days; mostly localized to the right lower jaw.  No evidence of abscess.  Discussed pain control d/c home and follow-up with outpatient providers.

## 2019-02-14 NOTE — ED PROVIDER NOTE - NSFOLLOWUPINSTRUCTIONS_ED_ALL_ED_FT
Follow-up with your Primary Care Physician within 24-48 hours.  Please return to the Emergency Department immediately for any new, worsening or concerning symptoms.    Can use Tylenol (up to 1000mg every 6 hours) or Ibuprofen (up to 600mg every 6 hours) as per package directions for pain - use only as needed.  These are over-the-counter medications - please respect the warnings on the label.

## 2019-04-11 ENCOUNTER — EMERGENCY (EMERGENCY)
Facility: HOSPITAL | Age: 24
LOS: 1 days | Discharge: HOME | End: 2019-04-11
Admitting: EMERGENCY MEDICINE
Payer: MEDICAID

## 2019-04-11 VITALS
TEMPERATURE: 98 F | DIASTOLIC BLOOD PRESSURE: 74 MMHG | OXYGEN SATURATION: 100 % | SYSTOLIC BLOOD PRESSURE: 113 MMHG | HEART RATE: 72 BPM | RESPIRATION RATE: 16 BRPM

## 2019-04-11 VITALS
OXYGEN SATURATION: 97 % | DIASTOLIC BLOOD PRESSURE: 63 MMHG | TEMPERATURE: 98 F | SYSTOLIC BLOOD PRESSURE: 119 MMHG | HEART RATE: 77 BPM | RESPIRATION RATE: 16 BRPM

## 2019-04-11 LAB
ALBUMIN SERPL ELPH-MCNC: 4.5 G/DL — SIGNIFICANT CHANGE UP (ref 3.3–5)
ALP SERPL-CCNC: 54 U/L — SIGNIFICANT CHANGE UP (ref 40–120)
ALT FLD-CCNC: 16 U/L — SIGNIFICANT CHANGE UP (ref 4–33)
ANION GAP SERPL CALC-SCNC: 14 MMO/L — SIGNIFICANT CHANGE UP (ref 7–14)
AST SERPL-CCNC: 32 U/L — SIGNIFICANT CHANGE UP (ref 4–32)
BASOPHILS # BLD AUTO: 0.05 K/UL — SIGNIFICANT CHANGE UP (ref 0–0.2)
BASOPHILS NFR BLD AUTO: 0.4 % — SIGNIFICANT CHANGE UP (ref 0–2)
BILIRUB SERPL-MCNC: 0.3 MG/DL — SIGNIFICANT CHANGE UP (ref 0.2–1.2)
BUN SERPL-MCNC: 14 MG/DL — SIGNIFICANT CHANGE UP (ref 7–23)
CALCIUM SERPL-MCNC: 9.7 MG/DL — SIGNIFICANT CHANGE UP (ref 8.4–10.5)
CHLORIDE SERPL-SCNC: 103 MMOL/L — SIGNIFICANT CHANGE UP (ref 98–107)
CO2 SERPL-SCNC: 22 MMOL/L — SIGNIFICANT CHANGE UP (ref 22–31)
CREAT SERPL-MCNC: 0.7 MG/DL — SIGNIFICANT CHANGE UP (ref 0.5–1.3)
EOSINOPHIL # BLD AUTO: 0.04 K/UL — SIGNIFICANT CHANGE UP (ref 0–0.5)
EOSINOPHIL NFR BLD AUTO: 0.3 % — SIGNIFICANT CHANGE UP (ref 0–6)
GLUCOSE SERPL-MCNC: 85 MG/DL — SIGNIFICANT CHANGE UP (ref 70–99)
HCT VFR BLD CALC: 39.7 % — SIGNIFICANT CHANGE UP (ref 34.5–45)
HGB BLD-MCNC: 13.2 G/DL — SIGNIFICANT CHANGE UP (ref 11.5–15.5)
IMM GRANULOCYTES NFR BLD AUTO: 0.2 % — SIGNIFICANT CHANGE UP (ref 0–1.5)
LYMPHOCYTES # BLD AUTO: 17.3 % — SIGNIFICANT CHANGE UP (ref 13–44)
LYMPHOCYTES # BLD AUTO: 2.08 K/UL — SIGNIFICANT CHANGE UP (ref 1–3.3)
MCHC RBC-ENTMCNC: 30.5 PG — SIGNIFICANT CHANGE UP (ref 27–34)
MCHC RBC-ENTMCNC: 33.2 % — SIGNIFICANT CHANGE UP (ref 32–36)
MCV RBC AUTO: 91.7 FL — SIGNIFICANT CHANGE UP (ref 80–100)
MONOCYTES # BLD AUTO: 0.45 K/UL — SIGNIFICANT CHANGE UP (ref 0–0.9)
MONOCYTES NFR BLD AUTO: 3.7 % — SIGNIFICANT CHANGE UP (ref 2–14)
NEUTROPHILS # BLD AUTO: 9.37 K/UL — HIGH (ref 1.8–7.4)
NEUTROPHILS NFR BLD AUTO: 78.1 % — HIGH (ref 43–77)
NRBC # FLD: 0 K/UL — SIGNIFICANT CHANGE UP (ref 0–0)
PLATELET # BLD AUTO: 244 K/UL — SIGNIFICANT CHANGE UP (ref 150–400)
PMV BLD: 12.3 FL — SIGNIFICANT CHANGE UP (ref 7–13)
POTASSIUM SERPL-MCNC: 5.3 MMOL/L — SIGNIFICANT CHANGE UP (ref 3.5–5.3)
POTASSIUM SERPL-SCNC: 5.3 MMOL/L — SIGNIFICANT CHANGE UP (ref 3.5–5.3)
PROT SERPL-MCNC: 8.4 G/DL — HIGH (ref 6–8.3)
RBC # BLD: 4.33 M/UL — SIGNIFICANT CHANGE UP (ref 3.8–5.2)
RBC # FLD: 12.5 % — SIGNIFICANT CHANGE UP (ref 10.3–14.5)
SODIUM SERPL-SCNC: 139 MMOL/L — SIGNIFICANT CHANGE UP (ref 135–145)
WBC # BLD: 12.02 K/UL — HIGH (ref 3.8–10.5)
WBC # FLD AUTO: 12.02 K/UL — HIGH (ref 3.8–10.5)

## 2019-04-11 PROCEDURE — 99284 EMERGENCY DEPT VISIT MOD MDM: CPT

## 2019-04-11 RX ORDER — DEXAMETHASONE 0.5 MG/5ML
10 ELIXIR ORAL ONCE
Qty: 0 | Refills: 0 | Status: COMPLETED | OUTPATIENT
Start: 2019-04-11 | End: 2019-04-11

## 2019-04-11 RX ORDER — SODIUM CHLORIDE 9 MG/ML
1000 INJECTION INTRAMUSCULAR; INTRAVENOUS; SUBCUTANEOUS ONCE
Qty: 0 | Refills: 0 | Status: COMPLETED | OUTPATIENT
Start: 2019-04-11 | End: 2019-04-11

## 2019-04-11 RX ORDER — METOCLOPRAMIDE HCL 10 MG
10 TABLET ORAL ONCE
Qty: 0 | Refills: 0 | Status: COMPLETED | OUTPATIENT
Start: 2019-04-11 | End: 2019-04-11

## 2019-04-11 RX ORDER — KETOROLAC TROMETHAMINE 30 MG/ML
15 SYRINGE (ML) INJECTION ONCE
Qty: 0 | Refills: 0 | Status: DISCONTINUED | OUTPATIENT
Start: 2019-04-11 | End: 2019-04-11

## 2019-04-11 RX ADMIN — SODIUM CHLORIDE 1000 MILLILITER(S): 9 INJECTION INTRAMUSCULAR; INTRAVENOUS; SUBCUTANEOUS at 18:43

## 2019-04-11 RX ADMIN — Medication 10 MILLIGRAM(S): at 18:43

## 2019-04-11 RX ADMIN — Medication 15 MILLIGRAM(S): at 19:00

## 2019-04-11 RX ADMIN — Medication 102 MILLIGRAM(S): at 19:00

## 2019-04-11 NOTE — ED ADULT NURSE NOTE - OBJECTIVE STATEMENT
received to intake room 4. complains of headache and nausea since this morning. vomited 5 times. denies any abd pain, dizziness, sob, chest pain. labs sent. 20 g iv placed in right ac. awaits results in nad with family at bedside.

## 2019-04-11 NOTE — ED PROVIDER NOTE - OBJECTIVE STATEMENT
24 y/o F hx childhood seizures (last seizure 10 years ago, not on meds) and headaches, presents w/ Left sided throbbing headache associated w/ N/V, and dizziness, intermittent x 3 weeks, worse today. Describes gradual onset left sided headache, N/V follows about 2 hours later, not responsive to Advil at home. Dizziness described as "head feeling heavy". Has had these episodes/HA for months, last seen in the [facility] ER July 2018 for similar presentation (HA, dizziness, blurred vision, n/v), was seen by neuro at the time that attributed sx to migraines, sp an unremarkable MRI head. Pt did not take any pain meds today. Denies fevers, chills, rash, palpitations, shortness of breath or any other complaints. Has not seen a neurologist despite past recommendations.

## 2019-04-11 NOTE — ED PROVIDER NOTE - PROGRESS NOTE DETAILS
CHRIS Santos: Pt reassessed, headache and dizziness completely resolved. Will dc w/ neuro follow up for further migraine management, referral list given

## 2019-04-11 NOTE — ED PROVIDER NOTE - NSFOLLOWUPINSTRUCTIONS_ED_ALL_ED_FT
See your primary care doctor within 24-48 hours. Follow up with neurology this week for further evaluation, referral list provided. Bring copies of all reports with you. Take Motrin 600mg every 8hrs with food for pain. Drink plenty of fluids. Return to the ER for worsening symptoms or any other concerns.

## 2019-04-11 NOTE — ED ADULT TRIAGE NOTE - CHIEF COMPLAINT QUOTE
states" I am very nauseous and vomited twice today and feel dizzy with head ache since today ". patient also states she had similar one week ago. h/o vertigo.

## 2019-04-11 NOTE — ED PROVIDER NOTE - NS ED ROS FT
ROS:  GENERAL: No fever, no chills  EYES: +intermittent blurry vision non at present  HEENT: no trouble swallowing, no trouble speaking  CARDIAC: no chest pain  PULMONARY: no cough, no shortness of breath  GI: no abdominal pain, +nausea, +vomiting, no diarrhea, no constipation  : No dysuria, no frequency, no change in appearance, or odor of urine  SKIN: no rashes  NEURO: +headache, no weakness  MSK: No joint pain

## 2019-07-15 ENCOUNTER — EMERGENCY (EMERGENCY)
Facility: HOSPITAL | Age: 24
LOS: 1 days | Discharge: HOME | End: 2019-07-15
Admitting: STUDENT IN AN ORGANIZED HEALTH CARE EDUCATION/TRAINING PROGRAM
Payer: MEDICAID

## 2019-07-15 VITALS
HEART RATE: 61 BPM | SYSTOLIC BLOOD PRESSURE: 120 MMHG | RESPIRATION RATE: 16 BRPM | TEMPERATURE: 98 F | DIASTOLIC BLOOD PRESSURE: 71 MMHG | OXYGEN SATURATION: 100 %

## 2019-07-15 LAB
ALBUMIN SERPL ELPH-MCNC: 4.5 G/DL — SIGNIFICANT CHANGE UP (ref 3.3–5)
ALP SERPL-CCNC: 57 U/L — SIGNIFICANT CHANGE UP (ref 40–120)
ALT FLD-CCNC: 19 U/L — SIGNIFICANT CHANGE UP (ref 4–33)
ANION GAP SERPL CALC-SCNC: 11 MMO/L — SIGNIFICANT CHANGE UP (ref 7–14)
APPEARANCE UR: CLEAR — SIGNIFICANT CHANGE UP
AST SERPL-CCNC: 42 U/L — HIGH (ref 4–32)
BACTERIA # UR AUTO: NEGATIVE — SIGNIFICANT CHANGE UP
BASE EXCESS BLDV CALC-SCNC: 0.2 MMOL/L — SIGNIFICANT CHANGE UP
BASOPHILS # BLD AUTO: 0.04 K/UL — SIGNIFICANT CHANGE UP (ref 0–0.2)
BASOPHILS NFR BLD AUTO: 0.5 % — SIGNIFICANT CHANGE UP (ref 0–2)
BILIRUB SERPL-MCNC: < 0.2 MG/DL — LOW (ref 0.2–1.2)
BILIRUB UR-MCNC: NEGATIVE — SIGNIFICANT CHANGE UP
BLOOD GAS VENOUS - CREATININE: 0.79 MG/DL — SIGNIFICANT CHANGE UP (ref 0.5–1.3)
BLOOD UR QL VISUAL: NEGATIVE — SIGNIFICANT CHANGE UP
BUN SERPL-MCNC: 14 MG/DL — SIGNIFICANT CHANGE UP (ref 7–23)
CALCIUM SERPL-MCNC: 9.3 MG/DL — SIGNIFICANT CHANGE UP (ref 8.4–10.5)
CHLORIDE BLDV-SCNC: 112 MMOL/L — HIGH (ref 96–108)
CHLORIDE SERPL-SCNC: 105 MMOL/L — SIGNIFICANT CHANGE UP (ref 98–107)
CK SERPL-CCNC: 173 U/L — HIGH (ref 25–170)
CO2 SERPL-SCNC: 23 MMOL/L — SIGNIFICANT CHANGE UP (ref 22–31)
COLOR SPEC: SIGNIFICANT CHANGE UP
CREAT SERPL-MCNC: 0.78 MG/DL — SIGNIFICANT CHANGE UP (ref 0.5–1.3)
EOSINOPHIL # BLD AUTO: 0.1 K/UL — SIGNIFICANT CHANGE UP (ref 0–0.5)
EOSINOPHIL NFR BLD AUTO: 1.3 % — SIGNIFICANT CHANGE UP (ref 0–6)
GAS PNL BLDV: 138 MMOL/L — SIGNIFICANT CHANGE UP (ref 136–146)
GLUCOSE BLDV-MCNC: 82 MG/DL — SIGNIFICANT CHANGE UP (ref 70–99)
GLUCOSE SERPL-MCNC: 86 MG/DL — SIGNIFICANT CHANGE UP (ref 70–99)
GLUCOSE UR-MCNC: NEGATIVE — SIGNIFICANT CHANGE UP
HCO3 BLDV-SCNC: 22 MMOL/L — SIGNIFICANT CHANGE UP (ref 20–27)
HCT VFR BLD CALC: 38.7 % — SIGNIFICANT CHANGE UP (ref 34.5–45)
HCT VFR BLDV CALC: 39.2 % — SIGNIFICANT CHANGE UP (ref 34.5–45)
HGB BLD-MCNC: 12.6 G/DL — SIGNIFICANT CHANGE UP (ref 11.5–15.5)
HGB BLDV-MCNC: 12.8 G/DL — SIGNIFICANT CHANGE UP (ref 11.5–15.5)
HYALINE CASTS # UR AUTO: NEGATIVE — SIGNIFICANT CHANGE UP
IMM GRANULOCYTES NFR BLD AUTO: 0.3 % — SIGNIFICANT CHANGE UP (ref 0–1.5)
KETONES UR-MCNC: NEGATIVE — SIGNIFICANT CHANGE UP
LACTATE BLDV-MCNC: 1.9 MMOL/L — SIGNIFICANT CHANGE UP (ref 0.5–2)
LEUKOCYTE ESTERASE UR-ACNC: NEGATIVE — SIGNIFICANT CHANGE UP
LIDOCAIN IGE QN: 25.7 U/L — SIGNIFICANT CHANGE UP (ref 7–60)
LYMPHOCYTES # BLD AUTO: 3.4 K/UL — HIGH (ref 1–3.3)
LYMPHOCYTES # BLD AUTO: 42.5 % — SIGNIFICANT CHANGE UP (ref 13–44)
MCHC RBC-ENTMCNC: 30.9 PG — SIGNIFICANT CHANGE UP (ref 27–34)
MCHC RBC-ENTMCNC: 32.6 % — SIGNIFICANT CHANGE UP (ref 32–36)
MCV RBC AUTO: 94.9 FL — SIGNIFICANT CHANGE UP (ref 80–100)
MONOCYTES # BLD AUTO: 0.61 K/UL — SIGNIFICANT CHANGE UP (ref 0–0.9)
MONOCYTES NFR BLD AUTO: 7.6 % — SIGNIFICANT CHANGE UP (ref 2–14)
NEUTROPHILS # BLD AUTO: 3.83 K/UL — SIGNIFICANT CHANGE UP (ref 1.8–7.4)
NEUTROPHILS NFR BLD AUTO: 47.8 % — SIGNIFICANT CHANGE UP (ref 43–77)
NITRITE UR-MCNC: NEGATIVE — SIGNIFICANT CHANGE UP
NRBC # FLD: 0 K/UL — SIGNIFICANT CHANGE UP (ref 0–0)
PCO2 BLDV: 55 MMHG — HIGH (ref 41–51)
PH BLDV: 7.3 PH — LOW (ref 7.32–7.43)
PH UR: 7 — SIGNIFICANT CHANGE UP (ref 5–8)
PLATELET # BLD AUTO: 219 K/UL — SIGNIFICANT CHANGE UP (ref 150–400)
PMV BLD: 12.5 FL — SIGNIFICANT CHANGE UP (ref 7–13)
PO2 BLDV: < 24 MMHG — LOW (ref 35–40)
POTASSIUM BLDV-SCNC: 4.4 MMOL/L — SIGNIFICANT CHANGE UP (ref 3.4–4.5)
POTASSIUM SERPL-MCNC: 5.6 MMOL/L — HIGH (ref 3.5–5.3)
POTASSIUM SERPL-SCNC: 5.6 MMOL/L — HIGH (ref 3.5–5.3)
PROT SERPL-MCNC: 8.1 G/DL — SIGNIFICANT CHANGE UP (ref 6–8.3)
PROT UR-MCNC: NEGATIVE — SIGNIFICANT CHANGE UP
RBC # BLD: 4.08 M/UL — SIGNIFICANT CHANGE UP (ref 3.8–5.2)
RBC # FLD: 12.6 % — SIGNIFICANT CHANGE UP (ref 10.3–14.5)
RBC CASTS # UR COMP ASSIST: SIGNIFICANT CHANGE UP (ref 0–?)
SAO2 % BLDV: 24.8 % — LOW (ref 60–85)
SODIUM SERPL-SCNC: 139 MMOL/L — SIGNIFICANT CHANGE UP (ref 135–145)
SP GR SPEC: 1.02 — SIGNIFICANT CHANGE UP (ref 1–1.04)
SQUAMOUS # UR AUTO: SIGNIFICANT CHANGE UP
UROBILINOGEN FLD QL: NORMAL — SIGNIFICANT CHANGE UP
WBC # BLD: 8 K/UL — SIGNIFICANT CHANGE UP (ref 3.8–10.5)
WBC # FLD AUTO: 8 K/UL — SIGNIFICANT CHANGE UP (ref 3.8–10.5)
WBC UR QL: SIGNIFICANT CHANGE UP (ref 0–?)

## 2019-07-15 PROCEDURE — 71046 X-RAY EXAM CHEST 2 VIEWS: CPT | Mod: 26

## 2019-07-15 PROCEDURE — 76770 US EXAM ABDO BACK WALL COMP: CPT | Mod: 26

## 2019-07-15 PROCEDURE — 99284 EMERGENCY DEPT VISIT MOD MDM: CPT

## 2019-07-15 RX ORDER — SODIUM CHLORIDE 9 MG/ML
1000 INJECTION INTRAMUSCULAR; INTRAVENOUS; SUBCUTANEOUS ONCE
Refills: 0 | Status: COMPLETED | OUTPATIENT
Start: 2019-07-15 | End: 2019-07-15

## 2019-07-15 RX ORDER — KETOROLAC TROMETHAMINE 30 MG/ML
30 SYRINGE (ML) INJECTION ONCE
Refills: 0 | Status: DISCONTINUED | OUTPATIENT
Start: 2019-07-15 | End: 2019-07-15

## 2019-07-15 RX ORDER — CYCLOBENZAPRINE HYDROCHLORIDE 10 MG/1
10 TABLET, FILM COATED ORAL ONCE
Refills: 0 | Status: COMPLETED | OUTPATIENT
Start: 2019-07-15 | End: 2019-07-15

## 2019-07-15 RX ADMIN — SODIUM CHLORIDE 1000 MILLILITER(S): 9 INJECTION INTRAMUSCULAR; INTRAVENOUS; SUBCUTANEOUS at 22:35

## 2019-07-15 RX ADMIN — Medication 30 MILLIGRAM(S): at 22:35

## 2019-07-15 RX ADMIN — CYCLOBENZAPRINE HYDROCHLORIDE 10 MILLIGRAM(S): 10 TABLET, FILM COATED ORAL at 20:58

## 2019-07-15 NOTE — ED PROVIDER NOTE - CLINICAL SUMMARY MEDICAL DECISION MAKING FREE TEXT BOX
Patient is a 23 y.o female with PMHx of childhood seizures (many years ago not on medication) who presents to ED c/o Lt side flank pain intermittently x 1 week but now constant today. DDx- includes but not limited to; Renal colic, uti, will get labs, renal US, give analgesic, fluids, check UA/UC and will monitor closely and reassess.

## 2019-07-15 NOTE — ED PROVIDER NOTE - PLAN OF CARE
Patient advised to follow up with  PRIMARY CARE DOCTOR IN 1-2 DAYS  and told to return to the emergency department immediately for any new or concerning symptoms such as worsening pain, weakness, nausea, vomiting OR ANY OTHER COMPLAINTS. Patient agrees with plan.    Avoid heavy lifting or strenuous activity   No drinking alcohol, driving car or operate machinery if taking muscle relaxer   Drink lots of fluids     Advance activity as tolerated.  Continue all previously prescribed medications as directed unless otherwise instructed.  Follow up with your primary care physician in 48-72 hours- bring copies of your results.  Return to the ER for worsening or persistent symptoms, and/or ANY NEW OR CONCERNING SYMPTOMS. If you have issues obtaining follow up, please call: [phone] to obtain a doctor or specialist who takes your insurance in your area.  You may call [phone] to make an appointment with the internal medicine clinic.

## 2019-07-15 NOTE — ED PROVIDER NOTE - PHYSICAL EXAMINATION
Vital signs reviewed.   CONSTITUTIONAL: Well-appearing; well-nourished; in no apparent distress. Non-toxic appearing.   HEAD: Normocephalic, atraumatic.  EYES: PERRL, EOM intact, conjunctiva and sclera WNL.  ENT: normal nose; no rhinorrhea  NECK/LYMPH: Supple; non-tender  CARD: Normal S1, S2; no murmurs, rubs, or gallops noted.  RESP: Normal chest excursion with respiration; breath sounds clear and equal bilaterally; no wheezes, rhonchi, or rales.  ABD/GI: soft, non-distended; +Lt side flank tenderness noted + Suprapubic tenderness noted. No crepitus noted. No obvious deformities, ecchymosis noted.   EXT/MS: moves all extremities; distal pulses are normal, no pedal edema. No midline tenderness noted.   SKIN: Normal for age and race; warm; dry; good turgor; no apparent lesions or exudate noted.  NEURO: Awake, alert, oriented x 3, no gross deficits  PSYCH: Normal mood; appropriate affect.

## 2019-07-15 NOTE — ED PROVIDER NOTE - CARE PLAN
Principal Discharge DX:	Muscle pain  Assessment and plan of treatment:	Patient advised to follow up with  PRIMARY CARE DOCTOR IN 1-2 DAYS  and told to return to the emergency department immediately for any new or concerning symptoms such as worsening pain, weakness, nausea, vomiting OR ANY OTHER COMPLAINTS. Patient agrees with plan.    Avoid heavy lifting or strenuous activity   No drinking alcohol, driving car or operate machinery if taking muscle relaxer   Drink lots of fluids     Advance activity as tolerated.  Continue all previously prescribed medications as directed unless otherwise instructed.  Follow up with your primary care physician in 48-72 hours- bring copies of your results.  Return to the ER for worsening or persistent symptoms, and/or ANY NEW OR CONCERNING SYMPTOMS. If you have issues obtaining follow up, please call: [phone] to obtain a doctor or specialist who takes your insurance in your area.  You may call [phone] to make an appointment with the internal medicine clinic.

## 2019-07-15 NOTE — ED PROVIDER NOTE - PROGRESS NOTE DETAILS
Patient received fluids labs stable. CK down trended, now normal. Pt cxr normal. KUB renal US normal. Pt states feeling much better. Pt now stating also thinks may be from carrying and lifting her baby. Pt advised to f/u withPCP 1-2 days. Given rx for IBU and Flexeril, muscle relaxer precuations given. Pt understood and agreed with plan. All questions and concerns addressed. Strict return instructions given noted. Pt understands if continuing pain to return to ed immediately.

## 2019-07-15 NOTE — ED PROVIDER NOTE - OBJECTIVE STATEMENT
HPI: Patient is a 23 y.o female with PMHx of childhood seizures (many years ago not on medication) who presents to ED c/o Lt side flank pain intermittently x 1 week but now constant today. As per patient states that starting last monday she began to have this Lt side pain, pointing to Lt flank/LUQ region. States pain waxes and wanes in severity, no alleviating or modifying factors. Pain is non-radiating. Pt states today pain worse, more constant, admits took 400 mg IBU earlier today with little relief. Admits to urinary frequency.  Also notes began this new dance class, last class was sunday prior to pain starting following monday. Pt denies chest pain, pleuritic pain, shortness of breath. Denies dysuria, or hematuria, numbness or tingling, weakness, back pain, neck pain, dizziness, n/v/d, fevers, recent travel, OCP use, or any other complaints. Pt LMP last month. Also notes some irregular menstrual cycle. Denies vaginal bleeding.

## 2019-07-15 NOTE — ED ADULT NURSE NOTE - OBJECTIVE STATEMENT
pt a&Ox3, breathing even & unlabored,c/o intermittent LLQ pain x1 week, non radiaiting, pt denies n/v/d, dizziness, headache, blood in urine, dysuria, polyuria. pain on urination, weakness, numbness, sob, cp. pt denies pmh, 20 G Ivplacedin R AC, labs sent, will continue to monitor.

## 2019-07-15 NOTE — ED PROVIDER NOTE - NSFOLLOWUPINSTRUCTIONS_ED_ALL_ED_FT
Patient advised to follow up with  PRIMARY CARE DOCTOR IN 1-2 DAYS  and told to return to the emergency department immediately for any new or concerning symptoms such as worsening pain, weakness, nausea, vomiting OR ANY OTHER COMPLAINTS. Patient agrees with plan.    Avoid heavy lifting or strenuous activity   No drinking alcohol, driving car or operate machinery if taking muscle relaxer   Drink lots of fluids     Advance activity as tolerated.  Continue all previously prescribed medications as directed unless otherwise instructed.  Follow up with your primary care physician in 48-72 hours- bring copies of your results.  Return to the ER for worsening or persistent symptoms, and/or ANY NEW OR CONCERNING SYMPTOMS. If you have issues obtaining follow up, please call: [phone] to obtain a doctor or specialist who takes your insurance in your area.  You may call [phone] to make an appointment with the internal medicine clinic.

## 2019-07-16 VITALS
TEMPERATURE: 98 F | HEART RATE: 60 BPM | OXYGEN SATURATION: 97 % | SYSTOLIC BLOOD PRESSURE: 112 MMHG | DIASTOLIC BLOOD PRESSURE: 66 MMHG | RESPIRATION RATE: 16 BRPM

## 2019-07-16 PROBLEM — G43.909 MIGRAINE, UNSPECIFIED, NOT INTRACTABLE, WITHOUT STATUS MIGRAINOSUS: Chronic | Status: ACTIVE | Noted: 2019-04-11

## 2019-07-16 LAB
ALBUMIN SERPL ELPH-MCNC: 4.2 G/DL — SIGNIFICANT CHANGE UP (ref 3.3–5)
ALP SERPL-CCNC: 54 U/L — SIGNIFICANT CHANGE UP (ref 40–120)
ALT FLD-CCNC: 15 U/L — SIGNIFICANT CHANGE UP (ref 4–33)
ANION GAP SERPL CALC-SCNC: 9 MMO/L — SIGNIFICANT CHANGE UP (ref 7–14)
AST SERPL-CCNC: 19 U/L — SIGNIFICANT CHANGE UP (ref 4–32)
BILIRUB SERPL-MCNC: < 0.2 MG/DL — LOW (ref 0.2–1.2)
BUN SERPL-MCNC: 14 MG/DL — SIGNIFICANT CHANGE UP (ref 7–23)
CALCIUM SERPL-MCNC: 8.5 MG/DL — SIGNIFICANT CHANGE UP (ref 8.4–10.5)
CHLORIDE SERPL-SCNC: 110 MMOL/L — HIGH (ref 98–107)
CK SERPL-CCNC: 125 U/L — SIGNIFICANT CHANGE UP (ref 25–170)
CO2 SERPL-SCNC: 21 MMOL/L — LOW (ref 22–31)
CREAT SERPL-MCNC: 0.74 MG/DL — SIGNIFICANT CHANGE UP (ref 0.5–1.3)
GLUCOSE SERPL-MCNC: 79 MG/DL — SIGNIFICANT CHANGE UP (ref 70–99)
POTASSIUM SERPL-MCNC: 4.6 MMOL/L — SIGNIFICANT CHANGE UP (ref 3.5–5.3)
POTASSIUM SERPL-SCNC: 4.6 MMOL/L — SIGNIFICANT CHANGE UP (ref 3.5–5.3)
PROT SERPL-MCNC: 7 G/DL — SIGNIFICANT CHANGE UP (ref 6–8.3)
SODIUM SERPL-SCNC: 140 MMOL/L — SIGNIFICANT CHANGE UP (ref 135–145)

## 2019-07-16 RX ORDER — CYCLOBENZAPRINE HYDROCHLORIDE 10 MG/1
1 TABLET, FILM COATED ORAL
Qty: 9 | Refills: 0
Start: 2019-07-16 | End: 2019-11-16

## 2019-07-16 RX ORDER — IBUPROFEN 200 MG
1 TABLET ORAL
Qty: 20 | Refills: 0
Start: 2019-07-16 | End: 2019-07-20

## 2019-07-16 RX ORDER — IBUPROFEN 200 MG
1 TABLET ORAL
Qty: 20 | Refills: 0
Start: 2019-07-16 | End: 2019-11-18

## 2019-07-16 RX ORDER — CYCLOBENZAPRINE HYDROCHLORIDE 10 MG/1
1 TABLET, FILM COATED ORAL
Qty: 9 | Refills: 0
Start: 2019-07-16 | End: 2019-07-18

## 2019-07-17 LAB
BACTERIA UR CULT: SIGNIFICANT CHANGE UP
SPECIMEN SOURCE: SIGNIFICANT CHANGE UP

## 2019-11-14 ENCOUNTER — EMERGENCY (EMERGENCY)
Facility: HOSPITAL | Age: 24
LOS: 1 days | Discharge: HOME | End: 2019-11-14
Admitting: EMERGENCY MEDICINE
Payer: COMMERCIAL

## 2019-11-14 VITALS
HEART RATE: 73 BPM | DIASTOLIC BLOOD PRESSURE: 73 MMHG | TEMPERATURE: 98 F | RESPIRATION RATE: 18 BRPM | SYSTOLIC BLOOD PRESSURE: 106 MMHG | OXYGEN SATURATION: 99 %

## 2019-11-14 PROCEDURE — 99282 EMERGENCY DEPT VISIT SF MDM: CPT

## 2019-11-14 RX ORDER — CYCLOBENZAPRINE HYDROCHLORIDE 10 MG/1
10 TABLET, FILM COATED ORAL ONCE
Refills: 0 | Status: COMPLETED | OUTPATIENT
Start: 2019-11-14 | End: 2019-11-14

## 2019-11-14 RX ORDER — IBUPROFEN 200 MG
600 TABLET ORAL ONCE
Refills: 0 | Status: COMPLETED | OUTPATIENT
Start: 2019-11-14 | End: 2019-11-14

## 2019-11-14 RX ADMIN — Medication 600 MILLIGRAM(S): at 16:52

## 2019-11-14 RX ADMIN — CYCLOBENZAPRINE HYDROCHLORIDE 10 MILLIGRAM(S): 10 TABLET, FILM COATED ORAL at 16:51
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
## 2019-11-14 NOTE — ED PROVIDER NOTE - NSFOLLOWUPINSTRUCTIONS_ED_ALL_ED_FT
Follow up with an orthopedic doctor within one week  Take Motrin 600mg every 8 hours as needed for pain, take with food. Take Flexeril 10mgs once every 8 hours as needed for muscle spasms -- causes drowsiness; DO NOT drink alcohol, drive, or operate heavy machinery with this medication.  Return to the emergency department with any worsening or concerning symptoms, swelling, numbness/tingling, inability to bear weight or any other concerns.

## 2019-11-14 NOTE — ED PROVIDER NOTE - NSFOLLOWUPCLINICS_GEN_ALL_ED_FT
[facility]  Orthopedic Surgery  [address], 3rd & 4th [place] [address]  Phone: [phone]  Fax:   Follow Up Time:     [facility] [place]  Orthopedics  .  [place]   Phone: [phone]  Fax:   Follow Up Time:

## 2019-11-14 NOTE — ED PROVIDER NOTE - PATIENT PORTAL LINK FT
You can access the FollowMyHealth Patient Portal offered by [facility] by registering at the following website: [email or web address]. By joining [employer]’s FollowMyHealth portal, you will also be able to view your health information using other applications (apps) compatible with our system.

## 2022-06-03 NOTE — ED ADULT NURSE NOTE - PAIN RATING/NUMBER SCALE (0-10): ACTIVITY
7
06-03    131<L>  |  99  |  7   ----------------------------<  90  4.3   |  24  |  0.79    Ca    8.2<L>      03 Jun 2022 08:32  Phos  2.7     06-03  Mg     2.0     06-03    A1C with Estimated Average Glucose Result: 5.1 % (05-25-22 @ 16:12)

## 2022-07-22 ENCOUNTER — EMERGENCY (EMERGENCY)
Facility: HOSPITAL | Age: 27
LOS: 1 days | Discharge: HOME | End: 2022-07-22
Admitting: STUDENT IN AN ORGANIZED HEALTH CARE EDUCATION/TRAINING PROGRAM

## 2022-07-22 VITALS
OXYGEN SATURATION: 99 % | DIASTOLIC BLOOD PRESSURE: 61 MMHG | SYSTOLIC BLOOD PRESSURE: 106 MMHG | HEART RATE: 69 BPM | RESPIRATION RATE: 20 BRPM | TEMPERATURE: 98 F

## 2022-07-22 PROCEDURE — 93971 EXTREMITY STUDY: CPT | Mod: 26,LT

## 2022-07-22 PROCEDURE — 99284 EMERGENCY DEPT VISIT MOD MDM: CPT

## 2022-07-22 NOTE — ED PROVIDER NOTE - CLINICAL SUMMARY MEDICAL DECISION MAKING FREE TEXT BOX
27 y/o female with pmhx of COVID 19 one month ago, migraines, presents to ED c/o left calf pain x 3 weeks. Sent from  to r/o dvt. Pt states she has pain in her calf, her veins are tender to touch and sometimes area feels numb but can feel things. pt with left calf ttp on exam, neurovascularly intact. plan to check US r/o dvt. pt has apt with vascular 8/3 for follow up.

## 2022-07-22 NOTE — ED PROVIDER NOTE - CARDIAC, MLM
Normal rate, regular rhythm.  Heart sounds S1, S2.  No murmurs, rubs or gallops. +left calf TTP. Sensation intact. full rom of joints. 5/5 strength. Dp pulse 2+

## 2022-07-22 NOTE — ED PROVIDER NOTE - PATIENT PORTAL LINK FT
You can access the FollowMyHealth Patient Portal offered by [facility] by registering at the following website: [email or web address]. By joining [employer]’s FollowMyHealth portal, you will also be able to view your health information using other applications (apps) compatible with our system.

## 2022-07-22 NOTE — ED PROVIDER NOTE - NSFOLLOWUPINSTRUCTIONS_ED_ALL_ED_FT
Follow up with your Vascular Surgeon  Take Motrin 600mg every 8hrs with food for pain.     Worsening, continued or new concerning symptoms return to the emergency department.

## 2022-07-22 NOTE — ED PROVIDER NOTE - OBJECTIVE STATEMENT
25 y/o female with pmhx of COVID 19 one month ago, migraines, presents to ED c/o left calf pain x 3 weeks. Sent from  to r/o dvt. Pt states she has pain in her calf, her veins are tender to touch and sometimes area feels numb but can feel things. No tingling, weakness, back pain, incontinence, headache, dizziness, n/v, slurred speech, facial droop, cp, sob, palpitations. No ocps or estrogen use, recent travel, surgeries, hospitalizations, le edema, calf pain, hx of dvt/pe.

## 2022-07-22 NOTE — ED ADULT TRIAGE NOTE - CHIEF COMPLAINT QUOTE
Patient has c/o pain to left leg for the past three weeks. She sits on her job and feels like she may not be getting adequate circulation.

## 2022-11-16 ENCOUNTER — NON-APPOINTMENT (OUTPATIENT)
Age: 27
End: 2022-11-16

## 2023-01-09 ENCOUNTER — APPOINTMENT (OUTPATIENT)
Dept: AFTER HOURS CARE | Facility: EMERGENCY ROOM | Age: 28
End: 2023-01-09
Payer: COMMERCIAL

## 2023-01-09 DIAGNOSIS — J06.9 ACUTE UPPER RESPIRATORY INFECTION, UNSPECIFIED: ICD-10-CM

## 2023-01-09 PROCEDURE — 99203 OFFICE O/P NEW LOW 30 MIN: CPT | Mod: 95

## 2023-01-09 RX ORDER — ALBUTEROL SULFATE 90 UG/1
108 (90 BASE) INHALANT RESPIRATORY (INHALATION)
Qty: 1 | Refills: 1 | Status: ACTIVE | COMMUNITY
Start: 2023-01-09 | End: 1900-01-01

## 2023-01-09 RX ORDER — IPRATROPIUM BROMIDE 21 UG/1
0.03 SPRAY NASAL 3 TIMES DAILY
Qty: 60 | Refills: 0 | Status: ACTIVE | COMMUNITY
Start: 2023-01-09 | End: 1900-01-01

## 2023-01-09 RX ORDER — ONDANSETRON 4 MG/1
4 TABLET, ORALLY DISINTEGRATING ORAL EVERY 8 HOURS
Qty: 20 | Refills: 0 | Status: ACTIVE | COMMUNITY
Start: 2023-01-09 | End: 1900-01-01

## 2023-01-09 RX ORDER — BENZONATATE 100 MG/1
100 CAPSULE ORAL 3 TIMES DAILY
Qty: 30 | Refills: 0 | Status: ACTIVE | COMMUNITY
Start: 2023-01-09 | End: 1900-01-01

## 2023-01-09 NOTE — ASSESSMENT
[FreeTextEntry1] : 28 yo woman with PMHx of seizures in the past before 9 yoa now with fever for the past two days associated with cough, congestion and vomiting.  Patient's daughter with recent adenovirus requiring hospitalization to [facility] due to dehydration.  She now has similar symptoms.  She is calling for work note and excuse from work.  No SOB, chest pain, abd pain or diarrhea.  \par \par Assessment:\par \par Viral URI with vomiting\par \par

## 2023-01-09 NOTE — PLAN
[FreeTextEntry1] : Plan:\par \par Albuterol MDI-  2 puffs every  hours for coughing\par Ipratropium nasal spray as prescribed\par Benzonatate 100 mg 3 times per day for coughing\par Ondansetron ODT 4 mg every 8 hours as needed for vomiting\par Excused from work for 2 days- but will need to call employee health in the AM to discuss return to work.\par Perform a COVID swab to be sure it is negative.\par Monitor symptoms-  any new or worsening symptoms should prompt an ED visit.

## 2023-01-09 NOTE — HISTORY OF PRESENT ILLNESS
[Home] : at home, [unfilled] , at the time of the visit. [Other Location: e.g. Home (Enter Location, City,State)___] : at [unfilled] [Verbal consent obtained from patient] : the patient, [unfilled] [FreeTextEntry8] : 26 yo woman with PMHx of seizures in the past before 9 yoa now with fever for the past two days associated with cough, congestion and vomiting.  Patient's daughter with recent adenovirus requiring hospitalization to [facility] due to dehydration.  She now has similar symptoms.  She is calling for work note and excuse from work.  No SOB, chest pain, abd pain or diarrhea.

## 2023-01-10 ENCOUNTER — EMERGENCY (EMERGENCY)
Facility: HOSPITAL | Age: 28
LOS: 1 days | Discharge: HOME | End: 2023-01-10
Attending: EMERGENCY MEDICINE | Admitting: EMERGENCY MEDICINE
Payer: COMMERCIAL

## 2023-01-10 ENCOUNTER — TRANSCRIPTION ENCOUNTER (OUTPATIENT)
Age: 28
End: 2023-01-10

## 2023-01-10 VITALS
DIASTOLIC BLOOD PRESSURE: 64 MMHG | TEMPERATURE: 99 F | OXYGEN SATURATION: 100 % | SYSTOLIC BLOOD PRESSURE: 114 MMHG | HEART RATE: 96 BPM | RESPIRATION RATE: 18 BRPM

## 2023-01-10 VITALS
TEMPERATURE: 99 F | OXYGEN SATURATION: 100 % | RESPIRATION RATE: 18 BRPM | SYSTOLIC BLOOD PRESSURE: 114 MMHG | DIASTOLIC BLOOD PRESSURE: 67 MMHG | HEART RATE: 100 BPM

## 2023-01-10 LAB
FLUAV AG NPH QL: SIGNIFICANT CHANGE UP
FLUBV AG NPH QL: SIGNIFICANT CHANGE UP
RSV RNA NPH QL NAA+NON-PROBE: SIGNIFICANT CHANGE UP
SARS-COV-2 RNA SPEC QL NAA+PROBE: SIGNIFICANT CHANGE UP

## 2023-01-10 PROCEDURE — 71046 X-RAY EXAM CHEST 2 VIEWS: CPT | Mod: 26

## 2023-01-10 PROCEDURE — 99284 EMERGENCY DEPT VISIT MOD MDM: CPT

## 2023-01-10 RX ORDER — ONDANSETRON 8 MG/1
4 TABLET, FILM COATED ORAL ONCE
Refills: 0 | Status: COMPLETED | OUTPATIENT
Start: 2023-01-10 | End: 2023-01-10

## 2023-01-10 RX ORDER — IBUPROFEN 200 MG
600 TABLET ORAL ONCE
Refills: 0 | Status: COMPLETED | OUTPATIENT
Start: 2023-01-10 | End: 2023-01-10

## 2023-01-10 RX ORDER — ACETAMINOPHEN 500 MG
975 TABLET ORAL ONCE
Refills: 0 | Status: COMPLETED | OUTPATIENT
Start: 2023-01-10 | End: 2023-01-10

## 2023-01-10 RX ADMIN — Medication 975 MILLIGRAM(S): at 16:43

## 2023-01-10 RX ADMIN — ONDANSETRON 4 MILLIGRAM(S): 8 TABLET, FILM COATED ORAL at 16:44

## 2023-01-10 RX ADMIN — Medication 600 MILLIGRAM(S): at 16:43

## 2023-01-10 NOTE — ED ADULT TRIAGE NOTE - CHIEF COMPLAINT QUOTE
Pt c/o fever, chills, diarrhea and N/V since saturday. Pt states she also had an episode of SOB and palpitations yesterday, that since resolved.

## 2023-01-10 NOTE — ED PROVIDER NOTE - OBJECTIVE STATEMENT
27-year-old female presents with fever cough and congestion since Sunday.  Patient child had adenovirus and was admitted to [facility] about 2 weeks ago.  Since then everyone in the family has been sick with URI symptoms.  Patient was getting better until Sunday Sunday when she started developing cough and fever again.  This was associated with diarrhea and nausea 27-year-old female presents with fever cough and congestion since Sunday.  Patient's child had adenovirus and was admitted to [facility] about 2 weeks ago.  Since then everyone in the family has been sick with URI symptoms.  Patient was getting better until Sunday when she started developing cough and fever again.  This was associated with diarrhea and nausea.  Took zofran which improved her nausea.  today she's tolerating po well.  took a cough drop this morning and immediately developed palpitations and shortness of breath.  those symptoms lasted a few minutes.  now she's feeling better.  has nausea associated with the coughing.

## 2023-01-10 NOTE — ED ADULT NURSE NOTE - OBJECTIVE STATEMENT
Patient A&o X4, received in intake , with complaints of flu-like symptoms. Patient states, "since Saturday I haven't been feeling well". Patient complains of fever, chills, nausea and vomiting since yesterday. Patient also complains of palpitations yesterday which has since been resolved. Patient able to speak in clear sentences, respirations equal and unlabored. Lung sounds clear b/l, equal chest rise and fall noted. Denies CP/SOB, fever, chills, nausea, vomiting and diarrhea at this time. Skin warm and dry. Provider at bedside for eval, pending further orders.

## 2023-01-10 NOTE — ED PROVIDER NOTE - NSFOLLOWUPINSTRUCTIONS_ED_ALL_ED_FT
Follow up with your doctor in 2-3 days.    Use acetaminophen 975mg every 6 hours as needed for pain/fever.    Return to the ED for shortness of breath, vomiting or other emergent concerns.

## 2023-01-10 NOTE — ED PROVIDER NOTE - CLINICAL SUMMARY MEDICAL DECISION MAKING FREE TEXT BOX
Cough and URI symptoms.  likely viral in nature.  will get EKG and CXR.  will treat symptomatically and reassess.

## 2023-01-10 NOTE — ED ADULT NURSE NOTE - SUICIDE SCREENING DEPRESSION
Consults    History Of Present Illness  Cade is a 53 year old male presenting with acute ischemia of LE. He has h/o severe CAD, DM and HTN. He had a LHC performed today for recurrent Cad. After procedure lost pulse in LE. Ultrasound confirmed low flow. Went for right common femoral endarterectomy and patch angioplasty by Dr. Wilburn. In ICu after. Extubated, hds, pain in Right leg.     Past Medical History  Past Medical History:   Diagnosis Date   • Diabetes mellitus (CMS/HCC)    • Hyperlipidemia    • Hypertension    • Myocardial infarction (CMS/HCC)         Surgical History  Past Surgical History:   Procedure Laterality Date   • Cardiac catherization     • Coronary angioplasty with stent placement     • Coronary artery bypass graft      x 2        Social History  Social History     Tobacco Use   • Smoking status: Former Smoker   • Smokeless tobacco: Never Used   Substance Use Topics   • Alcohol use: Yes     Alcohol/week: 6.0 standard drinks     Types: 6 Cans of beer per week     Comment: spread over the weekend   • Drug use: Never       Family History  Family History   Problem Relation Age of Onset   • Diabetes Mother    • Heart disease Father         Allergies  ALLERGIES: no known allergies.    Medications  Medications Prior to Admission   Medication Sig Dispense Refill   • atenolol (TENORMIN) 50 MG tablet Take 50 mg by mouth daily.     • atorvastatin (LIPITOR) 40 MG tablet Take 40 mg by mouth daily.     • clopidogrel (PLAVIX) 75 MG tablet Take 75 mg by mouth daily.     • fenofibrate 160 MG tablet Take 160 mg by mouth daily.     • isosorbide mononitrate (IMDUR) 60 MG 24 hr tablet Take 60 mg by mouth daily.     • lisinopril (ZESTRIL) 10 MG tablet Take 10 mg by mouth daily.     • nitroGLYcerin (NITROSTAT) 0.4 MG sublingual tablet Place 0.4 mg under the tongue every 5 minutes as needed for Chest pain.     • ranolazine (RANEXA) 500 MG 12 hr tablet Take 500 mg by mouth daily.     • aspirin (ECOTRIN) 325 MG EC tablet  Take 325 mg by mouth daily.     • EVOLocumab (REPATHA) 140 MG/ML injection Inject 140 mg into the skin every 14 days.         Review of Systems  As per hpi     Physical Exam  Physical Exam   NAD  EOMI, UNIQUE  CTAB  [id number]  Groin pain  +ve dorsal pedis pulse in right side, warm    Last Recorded Vitals  Blood pressure 106/71, pulse (!) 59, temperature 98.6 °F (37 °C), temperature source Axillary, resp. rate 12, height 5' 2\" (1.575 m), weight 72.7 kg (160 lb 4.4 oz), SpO2 99 %.      Assessment & Plan   Right common femoral endarterectomy and patch angioplasty for partially occlusive thrombus in the right common femoral artery after cardiac cath POD#0  CAD  HTN  DM    Plan:  -Neuro vasc checks  -Pain control  -Cont cardiac meds  -Cardiology/ Vascular surgery following  -ICU prophylaxis    Code Status    Code Status: Full Resuscitation    Emanuel Wilkinson MD               Negative

## 2023-01-10 NOTE — ED PROVIDER NOTE - PATIENT PORTAL LINK FT
You can access the FollowMyHealth Patient Portal offered by [facility] by registering at the following website: [email or web address]. By joining [employer]s FollowMyHealth portal, you will also be able to view your health information using other applications (apps) compatible with our system.

## 2023-01-11 NOTE — ED POST DISCHARGE NOTE - REASON FOR FOLLOW-UP
Other PT called for RVP results. Discussed with patient Influenza, COVID-19 and RSV all negative. Discussed with patient need to return to ED if symptoms don't continue to improve or recur or develops any new or worsening symptoms that are of concern.

## 2023-03-31 ENCOUNTER — NON-APPOINTMENT (OUTPATIENT)
Age: 28
End: 2023-03-31

## 2023-09-23 ENCOUNTER — APPOINTMENT (OUTPATIENT)
Dept: AFTER HOURS CARE | Facility: EMERGENCY ROOM | Age: 28
End: 2023-09-23
Payer: COMMERCIAL

## 2023-09-23 PROCEDURE — 99203 OFFICE O/P NEW LOW 30 MIN: CPT | Mod: 95

## 2023-10-19 ENCOUNTER — EMERGENCY (EMERGENCY)
Facility: HOSPITAL | Age: 28
LOS: 1 days | Discharge: HOME | End: 2023-10-19
Attending: EMERGENCY MEDICINE | Admitting: EMERGENCY MEDICINE
Payer: COMMERCIAL

## 2023-10-19 VITALS
HEART RATE: 76 BPM | TEMPERATURE: 98 F | SYSTOLIC BLOOD PRESSURE: 109 MMHG | OXYGEN SATURATION: 100 % | DIASTOLIC BLOOD PRESSURE: 70 MMHG | RESPIRATION RATE: 17 BRPM

## 2023-10-19 VITALS
OXYGEN SATURATION: 100 % | RESPIRATION RATE: 16 BRPM | TEMPERATURE: 98 F | HEART RATE: 76 BPM | SYSTOLIC BLOOD PRESSURE: 116 MMHG | DIASTOLIC BLOOD PRESSURE: 74 MMHG

## 2023-10-19 LAB
ALBUMIN SERPL ELPH-MCNC: 4.6 G/DL — SIGNIFICANT CHANGE UP (ref 3.3–5)
ALBUMIN SERPL ELPH-MCNC: 4.6 G/DL — SIGNIFICANT CHANGE UP (ref 3.3–5)
ALP SERPL-CCNC: 55 U/L — SIGNIFICANT CHANGE UP (ref 40–120)
ALP SERPL-CCNC: 55 U/L — SIGNIFICANT CHANGE UP (ref 40–120)
ALT FLD-CCNC: 17 U/L — SIGNIFICANT CHANGE UP (ref 4–33)
ALT FLD-CCNC: 17 U/L — SIGNIFICANT CHANGE UP (ref 4–33)
ANION GAP SERPL CALC-SCNC: 12 MMOL/L — SIGNIFICANT CHANGE UP (ref 7–14)
ANION GAP SERPL CALC-SCNC: 12 MMOL/L — SIGNIFICANT CHANGE UP (ref 7–14)
APPEARANCE UR: CLEAR — SIGNIFICANT CHANGE UP
APPEARANCE UR: CLEAR — SIGNIFICANT CHANGE UP
AST SERPL-CCNC: 18 U/L — SIGNIFICANT CHANGE UP (ref 4–32)
AST SERPL-CCNC: 18 U/L — SIGNIFICANT CHANGE UP (ref 4–32)
BILIRUB SERPL-MCNC: <0.2 MG/DL — SIGNIFICANT CHANGE UP (ref 0.2–1.2)
BILIRUB SERPL-MCNC: <0.2 MG/DL — SIGNIFICANT CHANGE UP (ref 0.2–1.2)
BILIRUB UR-MCNC: NEGATIVE — SIGNIFICANT CHANGE UP
BILIRUB UR-MCNC: NEGATIVE — SIGNIFICANT CHANGE UP
BLOOD GAS VENOUS COMPREHENSIVE RESULT: SIGNIFICANT CHANGE UP
BLOOD GAS VENOUS COMPREHENSIVE RESULT: SIGNIFICANT CHANGE UP
BUN SERPL-MCNC: 14 MG/DL — SIGNIFICANT CHANGE UP (ref 7–23)
BUN SERPL-MCNC: 14 MG/DL — SIGNIFICANT CHANGE UP (ref 7–23)
CALCIUM SERPL-MCNC: 9.2 MG/DL — SIGNIFICANT CHANGE UP (ref 8.4–10.5)
CALCIUM SERPL-MCNC: 9.2 MG/DL — SIGNIFICANT CHANGE UP (ref 8.4–10.5)
CHLORIDE SERPL-SCNC: 107 MMOL/L — SIGNIFICANT CHANGE UP (ref 98–107)
CHLORIDE SERPL-SCNC: 107 MMOL/L — SIGNIFICANT CHANGE UP (ref 98–107)
CO2 SERPL-SCNC: 22 MMOL/L — SIGNIFICANT CHANGE UP (ref 22–31)
CO2 SERPL-SCNC: 22 MMOL/L — SIGNIFICANT CHANGE UP (ref 22–31)
COLOR SPEC: YELLOW — SIGNIFICANT CHANGE UP
COLOR SPEC: YELLOW — SIGNIFICANT CHANGE UP
CREAT SERPL-MCNC: 0.67 MG/DL — SIGNIFICANT CHANGE UP (ref 0.5–1.3)
CREAT SERPL-MCNC: 0.67 MG/DL — SIGNIFICANT CHANGE UP (ref 0.5–1.3)
DIFF PNL FLD: NEGATIVE — SIGNIFICANT CHANGE UP
DIFF PNL FLD: NEGATIVE — SIGNIFICANT CHANGE UP
EGFR: 121 ML/MIN/1.73M2 — SIGNIFICANT CHANGE UP
EGFR: 121 ML/MIN/1.73M2 — SIGNIFICANT CHANGE UP
GLUCOSE SERPL-MCNC: 99 MG/DL — SIGNIFICANT CHANGE UP (ref 70–99)
GLUCOSE SERPL-MCNC: 99 MG/DL — SIGNIFICANT CHANGE UP (ref 70–99)
GLUCOSE UR QL: NEGATIVE MG/DL — SIGNIFICANT CHANGE UP
GLUCOSE UR QL: NEGATIVE MG/DL — SIGNIFICANT CHANGE UP
HCG SERPL-ACNC: <1 MIU/ML — SIGNIFICANT CHANGE UP
HCG SERPL-ACNC: <1 MIU/ML — SIGNIFICANT CHANGE UP
HCT VFR BLD CALC: 37.4 % — SIGNIFICANT CHANGE UP (ref 34.5–45)
HCT VFR BLD CALC: 37.4 % — SIGNIFICANT CHANGE UP (ref 34.5–45)
HGB BLD-MCNC: 12.5 G/DL — SIGNIFICANT CHANGE UP (ref 11.5–15.5)
HGB BLD-MCNC: 12.5 G/DL — SIGNIFICANT CHANGE UP (ref 11.5–15.5)
KETONES UR-MCNC: NEGATIVE MG/DL — SIGNIFICANT CHANGE UP
KETONES UR-MCNC: NEGATIVE MG/DL — SIGNIFICANT CHANGE UP
LEUKOCYTE ESTERASE UR-ACNC: ABNORMAL
LEUKOCYTE ESTERASE UR-ACNC: ABNORMAL
MCHC RBC-ENTMCNC: 30.9 PG — SIGNIFICANT CHANGE UP (ref 27–34)
MCHC RBC-ENTMCNC: 30.9 PG — SIGNIFICANT CHANGE UP (ref 27–34)
MCHC RBC-ENTMCNC: 33.4 GM/DL — SIGNIFICANT CHANGE UP (ref 32–36)
MCHC RBC-ENTMCNC: 33.4 GM/DL — SIGNIFICANT CHANGE UP (ref 32–36)
MCV RBC AUTO: 92.6 FL — SIGNIFICANT CHANGE UP (ref 80–100)
MCV RBC AUTO: 92.6 FL — SIGNIFICANT CHANGE UP (ref 80–100)
NITRITE UR-MCNC: NEGATIVE — SIGNIFICANT CHANGE UP
NITRITE UR-MCNC: NEGATIVE — SIGNIFICANT CHANGE UP
NRBC # BLD: 0 /100 WBCS — SIGNIFICANT CHANGE UP (ref 0–0)
NRBC # BLD: 0 /100 WBCS — SIGNIFICANT CHANGE UP (ref 0–0)
NRBC # FLD: 0 K/UL — SIGNIFICANT CHANGE UP (ref 0–0)
NRBC # FLD: 0 K/UL — SIGNIFICANT CHANGE UP (ref 0–0)
PH UR: 7 — SIGNIFICANT CHANGE UP (ref 5–8)
PH UR: 7 — SIGNIFICANT CHANGE UP (ref 5–8)
PLATELET # BLD AUTO: 263 K/UL — SIGNIFICANT CHANGE UP (ref 150–400)
PLATELET # BLD AUTO: 263 K/UL — SIGNIFICANT CHANGE UP (ref 150–400)
POTASSIUM SERPL-MCNC: 4.7 MMOL/L — SIGNIFICANT CHANGE UP (ref 3.5–5.3)
POTASSIUM SERPL-MCNC: 4.7 MMOL/L — SIGNIFICANT CHANGE UP (ref 3.5–5.3)
POTASSIUM SERPL-SCNC: 4.7 MMOL/L — SIGNIFICANT CHANGE UP (ref 3.5–5.3)
POTASSIUM SERPL-SCNC: 4.7 MMOL/L — SIGNIFICANT CHANGE UP (ref 3.5–5.3)
PROT SERPL-MCNC: 7.1 G/DL — SIGNIFICANT CHANGE UP (ref 6–8.3)
PROT SERPL-MCNC: 7.1 G/DL — SIGNIFICANT CHANGE UP (ref 6–8.3)
PROT UR-MCNC: NEGATIVE MG/DL — SIGNIFICANT CHANGE UP
PROT UR-MCNC: NEGATIVE MG/DL — SIGNIFICANT CHANGE UP
RBC # BLD: 4.04 M/UL — SIGNIFICANT CHANGE UP (ref 3.8–5.2)
RBC # BLD: 4.04 M/UL — SIGNIFICANT CHANGE UP (ref 3.8–5.2)
RBC # FLD: 12.5 % — SIGNIFICANT CHANGE UP (ref 10.3–14.5)
RBC # FLD: 12.5 % — SIGNIFICANT CHANGE UP (ref 10.3–14.5)
SODIUM SERPL-SCNC: 141 MMOL/L — SIGNIFICANT CHANGE UP (ref 135–145)
SODIUM SERPL-SCNC: 141 MMOL/L — SIGNIFICANT CHANGE UP (ref 135–145)
SP GR SPEC: 1.01 — SIGNIFICANT CHANGE UP (ref 1–1.03)
SP GR SPEC: 1.01 — SIGNIFICANT CHANGE UP (ref 1–1.03)
TSH SERPL-MCNC: 0.92 UIU/ML — SIGNIFICANT CHANGE UP (ref 0.27–4.2)
TSH SERPL-MCNC: 0.92 UIU/ML — SIGNIFICANT CHANGE UP (ref 0.27–4.2)
UROBILINOGEN FLD QL: 0.2 MG/DL — SIGNIFICANT CHANGE UP (ref 0.2–1)
UROBILINOGEN FLD QL: 0.2 MG/DL — SIGNIFICANT CHANGE UP (ref 0.2–1)
WBC # BLD: 8.96 K/UL — SIGNIFICANT CHANGE UP (ref 3.8–10.5)
WBC # BLD: 8.96 K/UL — SIGNIFICANT CHANGE UP (ref 3.8–10.5)
WBC # FLD AUTO: 8.96 K/UL — SIGNIFICANT CHANGE UP (ref 3.8–10.5)
WBC # FLD AUTO: 8.96 K/UL — SIGNIFICANT CHANGE UP (ref 3.8–10.5)

## 2023-10-19 PROCEDURE — 99284 EMERGENCY DEPT VISIT MOD MDM: CPT

## 2023-10-19 PROCEDURE — 93970 EXTREMITY STUDY: CPT | Mod: 26

## 2023-10-19 PROCEDURE — 93010 ELECTROCARDIOGRAM REPORT: CPT

## 2023-10-19 RX ADMIN — Medication 1 MILLIGRAM(S): at 07:05

## 2023-10-19 NOTE — ED PROVIDER NOTE - NSFOLLOWUPINSTRUCTIONS_ED_ALL_ED_FT
You were seen in the ER today for trouble sleeping, tremors, leg pain.    You had labs and an ultrasound done which did not show any emergent conditions.    Please follow up with your primary care doctor within 1 - 3 days. Call and let them know you were seen in the ER today.   Bring the results of your blood work and imaging with you to your appointment, if applicable.    Get plenty of rest, drink water, and eat regular meals.    Return to the ER for any worsening symptoms or concerns, including chest pain, shortness of breath, lightheadedness, weakness, or any other concerns.

## 2023-10-19 NOTE — ED ADULT NURSE NOTE - NSFALLUNIVINTERV_ED_ALL_ED
Bed/Stretcher in lowest position, wheels locked, appropriate side rails in place/Call bell, personal items and telephone in reach/Instruct patient to call for assistance before getting out of bed/chair/stretcher/Non-slip footwear applied when patient is off stretcher/[place] to call system/Physically safe environment - no spills, clutter or unnecessary equipment/Purposeful proactive rounding/Room/bathroom lighting operational, light cord in reach

## 2023-10-19 NOTE — ED PROVIDER NOTE - CLINICAL SUMMARY MEDICAL DECISION MAKING FREE TEXT BOX
concern for dvt, will get duplex. concern for hyperthryoid, will get tsh. Will obtain CMP to rule out electrolyte abnormalities, liver failure or renal failure. Will obtain cbc to rule out anemia. will rule out pregnancy and uti with urine tests.  reassess

## 2023-10-19 NOTE — ED PROVIDER NOTE - PROGRESS NOTE DETAILS
Ayleen Adan DO PGY-3  Patient signed out to me pending ultrasound read, negative for DVT.  Patient feels better, plan for discharge home with PMD follow-up in the next 3 days.    Discussed the plan for discharge home with the patient. Advised return precautions for any alarming or worsening symptoms, or any other concerns. Recommended that the patient call their primary care doctor today, inform them of their visit to the ER, and to obtain repeat evaluation from their PCP in the next 1-3 days. Patient expresses understanding and agrees with the plan for follow up. At the time of discharge the patient remained stable, in no acute distress.

## 2023-10-19 NOTE — ED PROVIDER NOTE - NSICDXPASTSURGICALHX_GEN_ALL_CORE_FT
PAST SURGICAL HISTORY:  No significant past surgical history      Acitretin Pregnancy And Lactation Text: This medication is Pregnancy Category X and should not be given to women who are pregnant or may become pregnant in the future. This medication is excreted in breast milk.

## 2023-10-19 NOTE — ED ADULT NURSE NOTE - CHIEF COMPLAINT QUOTE
pt c/o insomnia, nausea and "trembling inside". hasn't slept for 1 night. received flu shot yesterday AM. had COVID in september and had insomnia from prednisone. Phx seizures (pediatric)

## 2023-10-19 NOTE — ED ADULT TRIAGE NOTE - CHIEF COMPLAINT QUOTE
pt c/o insomnia, nausea and "trembling inside". hasn't slept for 1 night. received flu shot yesterday AM. had COVID in september and had insomnia from prednisone. Phx seizures (pediatric) show declines

## 2023-10-19 NOTE — ED ADULT NURSE NOTE - OBJECTIVE STATEMENT
pt presents to ED A&04 amublatory at baseline coming in complaining of insomnia, nausea and left leg pain x1 year. Respirations even and unlabored. Lung sounds clear with equal chest rise bilaterally. ABD is soft, non tender, non distended with normal active bowel sounds No complaints of chest pain, headache, nausea, dizziness, vomiting  SOB, fever, chills verbalized. awaiting further orders

## 2023-10-19 NOTE — ED PROVIDER NOTE - OBJECTIVE STATEMENT
27 year old complaints of "tension in body" also insomnia for 1 day. also complains of left calf pain for 1 year and migraine for 1 week. no chest pain or sob. no abd pain. no dysuria, no fever. rec'd flu shot 1 day ago

## 2023-10-21 ENCOUNTER — APPOINTMENT (OUTPATIENT)
Dept: AFTER HOURS CARE | Facility: EMERGENCY ROOM | Age: 28
End: 2023-10-21
Payer: COMMERCIAL

## 2023-10-21 PROCEDURE — 99214 OFFICE O/P EST MOD 30 MIN: CPT

## 2023-10-21 PROCEDURE — 99204 OFFICE O/P NEW MOD 45 MIN: CPT | Mod: 1L,95

## 2023-10-23 ENCOUNTER — EMERGENCY (EMERGENCY)
Facility: HOSPITAL | Age: 28
LOS: 1 days | Discharge: HOME | End: 2023-10-23
Admitting: EMERGENCY MEDICINE
Payer: COMMERCIAL

## 2023-10-23 VITALS
SYSTOLIC BLOOD PRESSURE: 111 MMHG | TEMPERATURE: 98 F | RESPIRATION RATE: 15 BRPM | DIASTOLIC BLOOD PRESSURE: 80 MMHG | HEART RATE: 72 BPM | OXYGEN SATURATION: 100 %

## 2023-10-23 PROCEDURE — 99283 EMERGENCY DEPT VISIT LOW MDM: CPT

## 2023-10-23 RX ORDER — IBUPROFEN 200 MG
400 TABLET ORAL ONCE
Refills: 0 | Status: COMPLETED | OUTPATIENT
Start: 2023-10-23 | End: 2023-10-23

## 2023-10-23 RX ADMIN — Medication 400 MILLIGRAM(S): at 05:59

## 2023-10-23 NOTE — ED PROVIDER NOTE - PATIENT PORTAL LINK FT
You can access the FollowMyHealth Patient Portal offered by [facility] by registering at the following website: [email or web address]. By joining [employer]’s FollowMyHealth portal, you will also be able to view your health information using other applications (apps) compatible with our system.

## 2023-10-23 NOTE — ED PROVIDER NOTE - OBJECTIVE STATEMENT
28 y/o F with PMH of seizures presents c/o b/l le weakness L>R x 1 month worsened over 1 week. Patient notes that her symptoms started after she was diagnosed with covid in 9/2023 and was treated with steroids at that time. She felt she had a bad reaction to the medication so she stopped taking it but despite that started to feel issues with her legs. She describes the problem as a combination of throbbing and weakness that starts in her thighs and then travels down and up the leg again. This problem would come and go but about a week ago she got her flu shot and since that time she felt like it got worse. She came to hospital for a similar concern at that time and had blood work and US LE performed without any significant findings. She also went to see a neurologist last week wednesday who recommended she get an eeg, emg, and mri lumbar spine but the test are expected to be done over the course of a few weeks. Patient notes that she started to feel the "weakness" again while trying to sleep last night but it kept her from sleeping so she decided to come back to the hospital for re-evaluation. Denies any other complaints or concerns. Denies chest pain, SOB, cough, fevers, abdominal pain, vomiting, diarrhea, urinary complaints, HA, dizziness, numbness, tingling, trauma, injuries, falls, sick contacts, recent travel.

## 2023-10-23 NOTE — ED PROVIDER NOTE - NSFOLLOWUPINSTRUCTIONS_ED_ALL_ED_FT
You were seen in the ED regarding a concern for leg weakness with odd sensations that has been present for 1 month but worsened within the last week. You were previously seen in the ED 4 days earlier where blood work, US doppler LE, and viral testing were done without any significant findings. We offered to repeat similar testing here today to evaluate for any changes or differences since last visit but otherwise no other new testing was appropriate to perform at this time during this new ED visit. You declined the repeat testing but were agreeable to try and take motrin for your discomfort to see if it might make the problem more tolerable until you can continue your work up with your neurologist outpatient. Follow up with your pcp within the week. Return to the ED immediately for any worsening symptoms or new concerns.

## 2023-10-23 NOTE — ED PROVIDER NOTE - CLINICAL SUMMARY MEDICAL DECISION MAKING FREE TEXT BOX
28 y/o F with PMH of seizures presents c/o b/l le weakness L>R x 1 month worsened over 1 week. Patient was already seen in the ED on 10/19/23 with a full work up including blood work, viral panel, UA, and US doppler that were negative for acute findings. Patient is also already seeing outpatient neurology and scheduled for all the appropriate tests that she will need to further evaluation this concern outpatient. Without any new acute findings on physical exam or new developments since previous visit there is unlikely anything else that we could offer to the patient at this time to assess this issue from an emergency perspective. Offered motrin 400mg for patients pain which she was agreeable to see if it will help with the pain to some extent.  Patient comfortable and stable for discharge with pcp and neuro follow up.  Instructed to return to the ED immediately for any worsening symptoms or new concerns.

## 2023-10-23 NOTE — ED ADULT NURSE NOTE - OBJECTIVE STATEMENT
Received pt in intake room 7 with Bilateral leg weakness and throbbing pain at times since she started to take steroids after Covid in September. Weakness to the left leg is more. patient ambulates with steady gait. patient also stated that she had flu shot on Wednesday and the symptoms become worse.

## 2023-10-23 NOTE — ED ADULT NURSE NOTE - NSFALLUNIVINTERV_ED_ALL_ED
Bed/Stretcher in lowest position, wheels locked, appropriate side rails in place/Call bell, personal items and telephone in reach/Instruct patient to call for assistance before getting out of bed/chair/stretcher/Non-slip footwear applied when patient is off stretcher/[place] to call system/Physically safe environment - no spills, clutter or unnecessary equipment/Purposeful proactive rounding/Room/bathroom lighting operational, light cord in reach

## 2023-10-23 NOTE — ED PROVIDER NOTE - PHYSICAL EXAMINATION
CONSTITUTIONAL: Comfortable; in no acute distress. Non-toxic appearing.   NEURO: Alert & oriented. Sensory and motor functions are grossly intact. Able to stand and ambulate in the ED with a steady gait.  PSYCH: Mood appropriate. Thought processes intact.   HEAD: NC/AT  ABD: Soft; non-distended; non-tender. No guarding or rebound.   MUSCULOSKELETAL/EXTREMITIES: FROM in all four extremities; no extremity edema.  SKIN: Warm; dry; no apparent lesions or exudate

## 2023-10-25 ENCOUNTER — NON-APPOINTMENT (OUTPATIENT)
Age: 28
End: 2023-10-25

## 2023-10-25 LAB — CK SERPL-CCNC: 93 U/L

## 2023-11-06 NOTE — ED ADULT NURSE NOTE - PRIMARY CARE PROVIDER
Virtual visit     Start time: 2:40PM  End time after chartin:57PM  Patient location: [facility] [place], [place]  Provider location: [address] [place] [id number]     Ms. Ballard is a 61 yo F with PMH recurrent falcine meningiomas s/p resection ,  presenting today for follow up with imaging. Since last telephone visit, patient has been doing well. She continues to get intermittent headaches that are short lived and self resolved. She notes she will get these 1-2 times per month. Denies new or worsening head pain, vision changes, weakness, nausea, vomiting. Patient last saw Dr. Sauer in 2018 where options were discussed including Gamma Knife versus surgical resection versus watchful waiting. Patient opted for watchful waiting. She was stuck in [facility] for 19 months during COVID and was lost to follow up after that.     She denies new PMH.     EXAM: MR BRAIN W/O and W CONTRAST  LOCATION: [facility]  DATE: 2023     INDICATION:  Meningioma (H).  COMPARISON: 2018.  CONTRAST: Gadavist 6mL.  TECHNIQUE: Routine multiplanar multisequence head MRI without and with intravenous contrast.     FINDINGS:  INTRACRANIAL CONTENTS: Increased size of parasagittal midline cranial vertex posterior dural-based homogeneously enhancing mass measuring 27 x 31 x 28 mm, previously 22 x 21 x 23 mm. Lesion continues to cause focal effacement of the superior sagittal   sinus. No signal changes to suggest dural venous sinus thrombosis. Anterior falx dural-based enhancing mass measures 7 x 10 mm, previously 9 x 4 mm. No significant change in inferior left parietal lobule suspected cavernoma.     No acute or subacute infarct. No mass, acute hemorrhage, or extra-axial fluid collections. Left superior parietal encephalomalacia and marginal gliosis. Scattered nonspecific T2/FLAIR hyperintensities within the cerebral white matter most consistent with   mild chronic  microvascular ischemic change. Normal ventricles and sulci. Normal position of the cerebellar tonsils.     SELLA: No abnormality accounting for technique.     OSSEOUS STRUCTURES/SOFT TISSUES: Multifocal left parieto-occipital craniotomy changes. Persistent multifocal areas of calvarial marrow postcontrast enhancement most pronounced involving the bilateral parietal bones as well as the parasagittal occipital   bones. Intracranial vascular flow voids are maintained.      ORBITS: No abnormality accounting for technique.      SINUSES/MASTOIDS: No paranasal sinus mucosal disease. No middle ear or mastoid effusion.                                                                       IMPRESSION:  1.  Slightly increased size of residual posterior cranial vertex and anterior falx meningiomas since 2018. Otherwise no significant change.  2.  No acute intracranial process.              Plan  -Reviewed MRI findings with patient. Discussed options of Gamma Knife versus surgical resection versus watchful waiting (in which case would recommend short interval follow up 3 months due to increase).   -She would like to hear more about Gamma Knife. Will set her up to meet with Dr. Sauer in [place] to discuss further    Patient in agreement  Dr. Sauer in agreement    [facility]  [address] [address]    Tel [phone]        unknown

## 2023-12-01 ENCOUNTER — APPOINTMENT (OUTPATIENT)
Dept: PAIN MANAGEMENT | Facility: CLINIC | Age: 28
End: 2023-12-01

## 2023-12-04 ENCOUNTER — EMERGENCY (EMERGENCY)
Facility: HOSPITAL | Age: 28
LOS: 1 days | Discharge: HOME | End: 2023-12-04
Attending: EMERGENCY MEDICINE | Admitting: EMERGENCY MEDICINE
Payer: COMMERCIAL

## 2023-12-04 VITALS
SYSTOLIC BLOOD PRESSURE: 99 MMHG | OXYGEN SATURATION: 100 % | HEART RATE: 71 BPM | TEMPERATURE: 99 F | DIASTOLIC BLOOD PRESSURE: 56 MMHG | RESPIRATION RATE: 14 BRPM

## 2023-12-04 LAB
ALBUMIN SERPL ELPH-MCNC: 4.3 G/DL — SIGNIFICANT CHANGE UP (ref 3.3–5)
ALBUMIN SERPL ELPH-MCNC: 4.3 G/DL — SIGNIFICANT CHANGE UP (ref 3.3–5)
ALP SERPL-CCNC: 35 U/L — LOW (ref 40–120)
ALP SERPL-CCNC: 35 U/L — LOW (ref 40–120)
ALT FLD-CCNC: 15 U/L — SIGNIFICANT CHANGE UP (ref 4–33)
ALT FLD-CCNC: 15 U/L — SIGNIFICANT CHANGE UP (ref 4–33)
ANION GAP SERPL CALC-SCNC: 12 MMOL/L — SIGNIFICANT CHANGE UP (ref 7–14)
ANION GAP SERPL CALC-SCNC: 12 MMOL/L — SIGNIFICANT CHANGE UP (ref 7–14)
APPEARANCE UR: CLEAR — SIGNIFICANT CHANGE UP
APPEARANCE UR: CLEAR — SIGNIFICANT CHANGE UP
AST SERPL-CCNC: 14 U/L — SIGNIFICANT CHANGE UP (ref 4–32)
AST SERPL-CCNC: 14 U/L — SIGNIFICANT CHANGE UP (ref 4–32)
BASOPHILS # BLD AUTO: 0.02 K/UL — SIGNIFICANT CHANGE UP (ref 0–0.2)
BASOPHILS # BLD AUTO: 0.02 K/UL — SIGNIFICANT CHANGE UP (ref 0–0.2)
BASOPHILS NFR BLD AUTO: 0.3 % — SIGNIFICANT CHANGE UP (ref 0–2)
BASOPHILS NFR BLD AUTO: 0.3 % — SIGNIFICANT CHANGE UP (ref 0–2)
BILIRUB SERPL-MCNC: 0.2 MG/DL — SIGNIFICANT CHANGE UP (ref 0.2–1.2)
BILIRUB SERPL-MCNC: 0.2 MG/DL — SIGNIFICANT CHANGE UP (ref 0.2–1.2)
BILIRUB UR-MCNC: NEGATIVE — SIGNIFICANT CHANGE UP
BILIRUB UR-MCNC: NEGATIVE — SIGNIFICANT CHANGE UP
BLD GP AB SCN SERPL QL: NEGATIVE — SIGNIFICANT CHANGE UP
BLD GP AB SCN SERPL QL: NEGATIVE — SIGNIFICANT CHANGE UP
BUN SERPL-MCNC: 5 MG/DL — LOW (ref 7–23)
BUN SERPL-MCNC: 5 MG/DL — LOW (ref 7–23)
CALCIUM SERPL-MCNC: 9.1 MG/DL — SIGNIFICANT CHANGE UP (ref 8.4–10.5)
CALCIUM SERPL-MCNC: 9.1 MG/DL — SIGNIFICANT CHANGE UP (ref 8.4–10.5)
CHLORIDE SERPL-SCNC: 103 MMOL/L — SIGNIFICANT CHANGE UP (ref 98–107)
CHLORIDE SERPL-SCNC: 103 MMOL/L — SIGNIFICANT CHANGE UP (ref 98–107)
CO2 SERPL-SCNC: 20 MMOL/L — LOW (ref 22–31)
CO2 SERPL-SCNC: 20 MMOL/L — LOW (ref 22–31)
COLOR SPEC: YELLOW — SIGNIFICANT CHANGE UP
COLOR SPEC: YELLOW — SIGNIFICANT CHANGE UP
CREAT SERPL-MCNC: 0.58 MG/DL — SIGNIFICANT CHANGE UP (ref 0.5–1.3)
CREAT SERPL-MCNC: 0.58 MG/DL — SIGNIFICANT CHANGE UP (ref 0.5–1.3)
DIFF PNL FLD: NEGATIVE — SIGNIFICANT CHANGE UP
DIFF PNL FLD: NEGATIVE — SIGNIFICANT CHANGE UP
EGFR: 126 ML/MIN/1.73M2 — SIGNIFICANT CHANGE UP
EGFR: 126 ML/MIN/1.73M2 — SIGNIFICANT CHANGE UP
EOSINOPHIL # BLD AUTO: 0.03 K/UL — SIGNIFICANT CHANGE UP (ref 0–0.5)
EOSINOPHIL # BLD AUTO: 0.03 K/UL — SIGNIFICANT CHANGE UP (ref 0–0.5)
EOSINOPHIL NFR BLD AUTO: 0.4 % — SIGNIFICANT CHANGE UP (ref 0–6)
EOSINOPHIL NFR BLD AUTO: 0.4 % — SIGNIFICANT CHANGE UP (ref 0–6)
GLUCOSE SERPL-MCNC: 83 MG/DL — SIGNIFICANT CHANGE UP (ref 70–99)
GLUCOSE SERPL-MCNC: 83 MG/DL — SIGNIFICANT CHANGE UP (ref 70–99)
GLUCOSE UR QL: NEGATIVE MG/DL — SIGNIFICANT CHANGE UP
GLUCOSE UR QL: NEGATIVE MG/DL — SIGNIFICANT CHANGE UP
HCG SERPL-ACNC: SIGNIFICANT CHANGE UP MIU/ML
HCG SERPL-ACNC: SIGNIFICANT CHANGE UP MIU/ML
HCT VFR BLD CALC: 33.1 % — LOW (ref 34.5–45)
HCT VFR BLD CALC: 33.1 % — LOW (ref 34.5–45)
HGB BLD-MCNC: 11.5 G/DL — SIGNIFICANT CHANGE UP (ref 11.5–15.5)
HGB BLD-MCNC: 11.5 G/DL — SIGNIFICANT CHANGE UP (ref 11.5–15.5)
IANC: 4.54 K/UL — SIGNIFICANT CHANGE UP (ref 1.8–7.4)
IANC: 4.54 K/UL — SIGNIFICANT CHANGE UP (ref 1.8–7.4)
IMM GRANULOCYTES NFR BLD AUTO: 0.6 % — SIGNIFICANT CHANGE UP (ref 0–0.9)
IMM GRANULOCYTES NFR BLD AUTO: 0.6 % — SIGNIFICANT CHANGE UP (ref 0–0.9)
KETONES UR-MCNC: NEGATIVE MG/DL — SIGNIFICANT CHANGE UP
KETONES UR-MCNC: NEGATIVE MG/DL — SIGNIFICANT CHANGE UP
LEUKOCYTE ESTERASE UR-ACNC: ABNORMAL
LEUKOCYTE ESTERASE UR-ACNC: ABNORMAL
LYMPHOCYTES # BLD AUTO: 1.77 K/UL — SIGNIFICANT CHANGE UP (ref 1–3.3)
LYMPHOCYTES # BLD AUTO: 1.77 K/UL — SIGNIFICANT CHANGE UP (ref 1–3.3)
LYMPHOCYTES # BLD AUTO: 24.5 % — SIGNIFICANT CHANGE UP (ref 13–44)
LYMPHOCYTES # BLD AUTO: 24.5 % — SIGNIFICANT CHANGE UP (ref 13–44)
MCHC RBC-ENTMCNC: 31.8 PG — SIGNIFICANT CHANGE UP (ref 27–34)
MCHC RBC-ENTMCNC: 31.8 PG — SIGNIFICANT CHANGE UP (ref 27–34)
MCHC RBC-ENTMCNC: 34.7 GM/DL — SIGNIFICANT CHANGE UP (ref 32–36)
MCHC RBC-ENTMCNC: 34.7 GM/DL — SIGNIFICANT CHANGE UP (ref 32–36)
MCV RBC AUTO: 91.4 FL — SIGNIFICANT CHANGE UP (ref 80–100)
MCV RBC AUTO: 91.4 FL — SIGNIFICANT CHANGE UP (ref 80–100)
MONOCYTES # BLD AUTO: 0.81 K/UL — SIGNIFICANT CHANGE UP (ref 0–0.9)
MONOCYTES # BLD AUTO: 0.81 K/UL — SIGNIFICANT CHANGE UP (ref 0–0.9)
MONOCYTES NFR BLD AUTO: 11.2 % — SIGNIFICANT CHANGE UP (ref 2–14)
MONOCYTES NFR BLD AUTO: 11.2 % — SIGNIFICANT CHANGE UP (ref 2–14)
NEUTROPHILS # BLD AUTO: 4.54 K/UL — SIGNIFICANT CHANGE UP (ref 1.8–7.4)
NEUTROPHILS # BLD AUTO: 4.54 K/UL — SIGNIFICANT CHANGE UP (ref 1.8–7.4)
NEUTROPHILS NFR BLD AUTO: 63 % — SIGNIFICANT CHANGE UP (ref 43–77)
NEUTROPHILS NFR BLD AUTO: 63 % — SIGNIFICANT CHANGE UP (ref 43–77)
NITRITE UR-MCNC: NEGATIVE — SIGNIFICANT CHANGE UP
NITRITE UR-MCNC: NEGATIVE — SIGNIFICANT CHANGE UP
NRBC # BLD: 0 /100 WBCS — SIGNIFICANT CHANGE UP (ref 0–0)
NRBC # BLD: 0 /100 WBCS — SIGNIFICANT CHANGE UP (ref 0–0)
NRBC # FLD: 0 K/UL — SIGNIFICANT CHANGE UP (ref 0–0)
NRBC # FLD: 0 K/UL — SIGNIFICANT CHANGE UP (ref 0–0)
PH UR: 7 — SIGNIFICANT CHANGE UP (ref 5–8)
PH UR: 7 — SIGNIFICANT CHANGE UP (ref 5–8)
PLATELET # BLD AUTO: 226 K/UL — SIGNIFICANT CHANGE UP (ref 150–400)
PLATELET # BLD AUTO: 226 K/UL — SIGNIFICANT CHANGE UP (ref 150–400)
POTASSIUM SERPL-MCNC: 3.6 MMOL/L — SIGNIFICANT CHANGE UP (ref 3.5–5.3)
POTASSIUM SERPL-MCNC: 3.6 MMOL/L — SIGNIFICANT CHANGE UP (ref 3.5–5.3)
POTASSIUM SERPL-SCNC: 3.6 MMOL/L — SIGNIFICANT CHANGE UP (ref 3.5–5.3)
POTASSIUM SERPL-SCNC: 3.6 MMOL/L — SIGNIFICANT CHANGE UP (ref 3.5–5.3)
PROT SERPL-MCNC: 7.4 G/DL — SIGNIFICANT CHANGE UP (ref 6–8.3)
PROT SERPL-MCNC: 7.4 G/DL — SIGNIFICANT CHANGE UP (ref 6–8.3)
PROT UR-MCNC: NEGATIVE MG/DL — SIGNIFICANT CHANGE UP
PROT UR-MCNC: NEGATIVE MG/DL — SIGNIFICANT CHANGE UP
RBC # BLD: 3.62 M/UL — LOW (ref 3.8–5.2)
RBC # BLD: 3.62 M/UL — LOW (ref 3.8–5.2)
RBC # FLD: 12.4 % — SIGNIFICANT CHANGE UP (ref 10.3–14.5)
RBC # FLD: 12.4 % — SIGNIFICANT CHANGE UP (ref 10.3–14.5)
RH IG SCN BLD-IMP: POSITIVE — SIGNIFICANT CHANGE UP
RH IG SCN BLD-IMP: POSITIVE — SIGNIFICANT CHANGE UP
SODIUM SERPL-SCNC: 135 MMOL/L — SIGNIFICANT CHANGE UP (ref 135–145)
SODIUM SERPL-SCNC: 135 MMOL/L — SIGNIFICANT CHANGE UP (ref 135–145)
SP GR SPEC: 1.01 — SIGNIFICANT CHANGE UP (ref 1–1.03)
SP GR SPEC: 1.01 — SIGNIFICANT CHANGE UP (ref 1–1.03)
UROBILINOGEN FLD QL: 0.2 MG/DL — SIGNIFICANT CHANGE UP (ref 0.2–1)
UROBILINOGEN FLD QL: 0.2 MG/DL — SIGNIFICANT CHANGE UP (ref 0.2–1)
WBC # BLD: 7.21 K/UL — SIGNIFICANT CHANGE UP (ref 3.8–10.5)
WBC # BLD: 7.21 K/UL — SIGNIFICANT CHANGE UP (ref 3.8–10.5)
WBC # FLD AUTO: 7.21 K/UL — SIGNIFICANT CHANGE UP (ref 3.8–10.5)
WBC # FLD AUTO: 7.21 K/UL — SIGNIFICANT CHANGE UP (ref 3.8–10.5)

## 2023-12-04 PROCEDURE — 99285 EMERGENCY DEPT VISIT HI MDM: CPT

## 2023-12-04 PROCEDURE — 76830 TRANSVAGINAL US NON-OB: CPT | Mod: 26

## 2023-12-04 RX ORDER — SODIUM CHLORIDE 9 MG/ML
1000 INJECTION INTRAMUSCULAR; INTRAVENOUS; SUBCUTANEOUS ONCE
Refills: 0 | Status: COMPLETED | OUTPATIENT
Start: 2023-12-04 | End: 2023-12-04

## 2023-12-04 RX ADMIN — SODIUM CHLORIDE 1000 MILLILITER(S): 9 INJECTION INTRAMUSCULAR; INTRAVENOUS; SUBCUTANEOUS at 15:18

## 2023-12-04 NOTE — ED PROVIDER NOTE - PATIENT PORTAL LINK FT
You can access the FollowMyHealth Patient Portal offered by [facility] by registering at the following website: [email or web address]. By joining [facility]’s FollowMyHealth portal, you will also be able to view your health information using other applications (apps) compatible with our system. You can access the FollowMyHealth Patient Portal offered by [facility] by registering at the following website: [email or web address]. By joining [facility]’s FollowMyHealth portal, you will also be able to view your health information using other applications (apps) compatible with our system.

## 2023-12-04 NOTE — ED PROVIDER NOTE - OBJECTIVE STATEMENT
27-year-old female presenting to the emergency department with concern for  nausea vomiting diarrhea in the setting of early pregnancy. Patient is G2, P1, LMP 10/3,OB appointment is tomorrow and has not yet had ultrasound in this pregnancy.  Reports that she and her daughter both became ill with vomiting diarrhea, her daughter tested positive for enterorhinovirus.  Patient herself has been experiencing multiple episodes of nonbloody emesis and nonbloody diarrhea persistently over the last 2 days.   Reports since this morning she has been able to tolerate p.o. No f/c/cough/cp/sob/dysuria/hematuria. No vaginal bleeding or dc. Reports no abd pain but has discomfort described as emptiness in abd.

## 2023-12-04 NOTE — ED PROVIDER NOTE - PHYSICAL EXAMINATION
GEN - NAD; well appearing; A+O x3   HEAD - NC/AT   EYES- PERRL, EOMI  ENT: Airway patent, dry mm, Oral cavity and pharynx normal. No inflammation, swelling, exudate, or lesions.    NECK: Neck supple  PULMONARY - CTA b/l, symmetric breath sounds.   CARDIAC -s1s2, RRR, no M,G,R  ABDOMEN - +BS, ND, NT, soft, no guarding, no rebound, no masses   BACK - no CVA tenderness, Normal  spine   EXTREMITIES - FROM, no edema   SKIN - no rash or bruising   NEUROLOGIC - alert, speech clear, no focal deficits  PSYCH -nl mood/affect, nl insight.

## 2023-12-04 NOTE — ED PROVIDER NOTE - CLINICAL SUMMARY MEDICAL DECISION MAKING FREE TEXT BOX
27-year-old female  LMP 10 3 presenting to the emergency department with nausea vomiting and diarrhea in the setting of early pregnancy  with sick contact at home with same symptoms. Has not yet had ultrasound in this pregnancy.  On exam is generally nontoxic, dry mucous membranes, abdomen soft and nontender.  Given has not yet had ultrasound, will check labs hydrate and check transvaginal ultrasound UA and culture.  Discussed with patient medication options and risks and benefits of utilizing medications in pregnancy for nausea vomiting.  Patient reports she understands and declines any medications at this time though is agreeable to receiving IV fluids.

## 2023-12-04 NOTE — ED ADULT NURSE NOTE - NSFALLUNIVINTERV_ED_ALL_ED
Bed/Stretcher in lowest position, wheels locked, appropriate side rails in place/Call bell, personal items and telephone in reach/Instruct patient to call for assistance before getting out of bed/chair/stretcher/Non-slip footwear applied when patient is off stretcher/[place] to call system/Physically safe environment - no spills, clutter or unnecessary equipment/Purposeful proactive rounding/Room/bathroom lighting operational, light cord in reach Bed/Stretcher in lowest position, wheels locked, appropriate side rails in place/Call bell, personal items and telephone in reach/Instruct patient to call for assistance before getting out of bed/chair/stretcher/Non-slip footwear applied when patient is off stretcher/[place] to call system/Physically safe environment - no spills, clutter or unnecessary equipment/Purposeful proactive rounding/Room/bathroom lighting operational, light cord in reach

## 2023-12-04 NOTE — ED ADULT NURSE NOTE - OBJECTIVE STATEMENT
Pt received to intake 7 A&OX4 ambulatory c/o N/V/D x 2 days. Pt approx. 8 weeks pregnant, reports nausea x 2 weeks but no vomiting. Pt daughter sick at home with similar symptoms. Denies vaginal bleeding. 20G IV placed in RAC. Labs drawn and sent. Fluids running as ordered. Pending US.

## 2023-12-04 NOTE — ED PROVIDER NOTE - NSFOLLOWUPINSTRUCTIONS_ED_ALL_ED_FT
FOLLOW UP WITH YOUR  DOCTOR WITHIN 1 WEEK  BRING THE COPIES OF YOUR RESULTS WITH YOU (PROVIDED)  DRINK MORE FLUIDS THAN USUAL UNTIL URINE IS LIGHT YELLOW  RETURN TO ED FOR NEW OR WORSENING SYMPTOMS.

## 2023-12-04 NOTE — ED PROVIDER NOTE - PROGRESS NOTE DETAILS
DD ED ATTG: rec'd s/o from Dr Gutierrez.  27F  LMP oct 1 c/o N/V/D, DA has rhinovirus.  Vague abd discomfort.  Nontender abd.  Has not had OB office, called OB->ED.  Declined meds.  Tried gatorade.  Consider trial of vit B6.  Awaiting urine and TVUS.

## 2023-12-04 NOTE — ED ADULT TRIAGE NOTE - CHIEF COMPLAINT QUOTE
Patient c/o nausea, vomiting and diarrhea starting yesterday. Reports positive pregnancy test at MD office, no ultrasound yet, LMP 10/3. Denies fever, abdominal pain, urinary symptoms, vaginal bleeding. Phx seizures

## 2023-12-04 NOTE — ED ADULT NURSE NOTE - SUICIDE SCREENING QUESTION 3
Subjective:      Dhaval Lieberman is a 4 wk.o. male here with mother. Patient brought in for Well Child      History of Present Illness:  Pt. Is getting breast milk in the botttle or parent's choice sensitive.   Mom only pumping 2 ounces each time so added formula.   Will take up to 4 ounces every 3 hours.  Spitting up maybe 1x/day.  Having stools about 3 x/day.  Coughing  But not regulary, does not seem sick. Not disrupting sleep or eating.           Review of Systems   Constitutional: Negative for activity change, appetite change, fever and irritability.   HENT: Negative for congestion, ear discharge, mouth sores and rhinorrhea.    Eyes: Negative for discharge and redness.   Respiratory: Positive for cough (infrequently). Negative for choking and wheezing.    Cardiovascular: Negative for leg swelling, fatigue with feeds, sweating with feeds and cyanosis.   Gastrointestinal: Positive for vomiting (1x/day). Negative for abdominal distention, constipation and diarrhea.   Genitourinary: Negative for decreased urine volume and hematuria.   Musculoskeletal: Negative for extremity weakness and joint swelling.   Skin: Negative for color change, rash and wound.   Neurological: Negative for facial asymmetry.   Hematological: Negative for adenopathy. Does not bruise/bleed easily.       Objective:     Physical Exam   Constitutional: He appears well-developed and well-nourished.   HENT:   Head: Anterior fontanelle is flat. No cranial deformity or facial anomaly.   Right Ear: Tympanic membrane normal.   Left Ear: Tympanic membrane normal.   Nose: Nose normal.   Mouth/Throat: Mucous membranes are moist.   Eyes: Conjunctivae and EOM are normal. Red reflex is present bilaterally. Pupils are equal, round, and reactive to light.   Neck: Normal range of motion.   Cardiovascular: Normal rate and regular rhythm.    Pulmonary/Chest: Effort normal.   Abdominal: Soft. Bowel sounds are normal.   Genitourinary: Penis normal.    Musculoskeletal: Normal range of motion.   No hip click   Neurological: He is alert.   Skin: Skin is warm.       Assessment:        1. Encounter for routine child health examination without abnormal findings         Plan:   Dhaval was seen today for well child.    Diagnoses and all orders for this visit:    Encounter for routine child health examination without abnormal findings      Patient Instructions       If you have an active [facility] account, please look for your well child questionnaire to come to your [facility] account before your next well child visit.    Well-Baby Checkup: Up to 1 Month     Its fine to take the baby out. Avoid prolonged sun exposure and crowds where germs can spread.     After your first  visit, your baby will likely have a checkup within his or her first month of life. At this checkup, the healthcare provider will examine the baby and ask how things are going at home. This sheet describes some of what you can expect.  Development and milestones  The healthcare provider will ask questions about your baby. He or she will observe the baby to get an idea of the infants development. By this visit, your baby is likely doing some of the following:  · Smiling for no apparent reason (called a spontaneous smile)  · Making eye contact, especially during feeding  · Making random sounds (also called vocalizing)  · Trying to lift his or her head  · Wiggling and squirming. Each arm and leg should move about the same amount. If not, tell the healthcare provider.  · Becoming startled when hearing a loud noise  Feeding tips  At around 2 weeks of age, your baby should be back to his or her birth weight. Continue to feed your baby either breastmilk or formula. To help your baby eat well:  · During the day, feed at least every 2 to 3 hours. You may need to wake the baby for daytime feedings.  · At night, feed when the baby wakes, often every 3 to 4 hours. You may choose not to wake the baby  for nighttime feedings. Discuss this with the healthcare provider.  · Breastfeeding sessions should last around 15 to 20 minutes. With a bottle, lowly increase the amount of formula or breastmilk you give your baby. By 1 month of age, most babies eat about 4 ounces per feeding, but this can vary.  · If youre concerned about how much or how often your baby eats, discuss this with the healthcare provider.  · Ask the healthcare provider if your baby should take vitamin D.  · Don't give the baby anything to eat besides breastmilk or formula. Your baby is too young for solid foods (solids) or other liquids. An infant this age does not need to be given water.  · Be aware that many babies begin to spit up around 1 month of age. In most cases, this is normal. Call the healthcare provider right away if the baby spits up often and forcefully, or spits up anything besides milk or formula.  Hygiene tips  · Some babies poop (have a bowel movement) a few times a day. Others poop as little as once every 2 to 3 days. Anything in this range is normal. Change the babys diaper when it becomes wet or dirty.  · Its fine if your baby poops even less often than every 2 to 3 days if the baby is otherwise healthy. But if the baby also becomes fussy, spits up more than normal, eats less than normal, or has very hard stool, tell the healthcare provider. The baby may be constipated (unable to have a bowel movement).  · Stool may range in color from mustard yellow to brown to green. If the stools are another color, tell the healthcare provider.  · Bathe your baby a few times per week. You may give baths more often if the baby enjoys it. But because youre cleaning the baby during diaper changes, a daily bath often isnt needed.  · Its OK to use mild (hypoallergenic) creams or lotions on the babys skin. Avoid putting lotion on the babys hands.  Sleeping tips  At this age, your baby may sleep up to 18 to 20 hours each day. Its common for  babies to sleep for short spurts throughout the day, rather than for hours at a time. The baby may be fussy before going to bed for the night (around 6 p.m. to 9 p.m.). This is normal. To help your baby sleep safely and soundly:  · Put your baby on his or her back for naps and sleeping until your child is 1 year old. This can lower the risk for SIDS, aspiration, and choking. Never put your baby on his or her side or stomach for sleep or naps. When your baby is awake, let your child spend time on his or her tummy as long as you are watching your child. This helps your child build strong tummy and neck muscles. This will also help keep your baby's head from flattening. This problem can happen when babies spend so much time on their back.  · Ask the healthcare provider if you should let your baby sleep with a pacifier. Sleeping with a pacifier has been shown to decrease the risk for SIDS. But it should not be offered until after breastfeeding has been established. If your baby doesn't want the pacifier, don't try to force him or her to take one.  · Don't put a crib bumper, pillow, loose blankets, or stuffed animals in the crib. These could suffocate the baby.  · Don't put your baby on a couch or armchair for sleep. Sleeping on a couch or armchair puts the baby at a much higher risk for death, including SIDS.  · Don't use infant seats, car seats, strollers, infant carriers, or infant swings for routine sleep and daily naps. These may cause a baby's airway to become blocked or the baby to suffocate.  · Swaddling (wrapping the baby in a blanket) can help the baby feel safe and fall asleep. Make sure your baby can easily move his or her legs.  · Its OK to put the baby to bed awake. Its also OK to let the baby cry in bed, but only for a few minutes. At this age, babies arent ready to cry themselves to sleep.  · If you have trouble getting your baby to sleep, ask the health care provider for tips.  · Don't share a bed  (co-sleep) with your baby. Bed-sharing has been shown to increase the risk for SIDS. The American Academy of Pediatrics says that babies should sleep in the same room as their parents. They should be close to their parents' bed, but in a separate bed or crib. This sleeping setup should be done for the baby's first year, if possible. But you should do it for at least the first 6 months.  · Always put cribs, bassinets, and play yards in areas with no hazards. This means no dangling cords, wires, or window coverings. This will lower the risk for strangulation.  · Don't use baby heart rate and monitors or special devices to help lower the risk for SIDS. These devices include wedges, positioners, and special mattresses. These devices have not been shown to prevent SIDS. In rare cases, they have caused the death of a baby.  · Talk with your baby's healthcare provider about these and other health and safety issues.  Safety tips  · To avoid burns, dont carry or drink hot liquids, such as coffee, near the baby. Turn the water heater down to a temperature of 120°F (49°C) or below.  · Dont smoke or allow others to smoke near the baby. If you or other family members smoke, do so outdoors while wearing a jacket, and then remove the jacket before holding the baby. Never smoke around the baby  · Its usually fine to take a  out of the house. But stay away from confined, crowded places where germs can spread.  · When you take the baby outside, don't stay too long in direct sunlight. Keep the baby covered, or seek out the shade.   · In the car, always put the baby in a rear-facing car seat. This should be secured in the back seat according to the car seats directions. Never leave the baby alone in the car.  · Don't leave the baby on a high surface such as a table, bed, or couch. He or she could fall and get hurt.  · Older siblings will likely want to hold, play with, and get to know the baby. This is fine as long as an  adult supervises.  · Call the healthcare provider right away if the baby has a fever (see Fever and children, below).  Vaccines  Based on recommendations from the CDC, your baby may get the hepatitis B vaccine if he or she did not already get it in the hospital after birth. Having your baby fully vaccinated will also help lower your baby's risk for SIDS.        Fever and children  Always use a digital thermometer to check your childs temperature. Never use a mercury thermometer.  For infants and toddlers, be sure to use a rectal thermometer correctly. A rectal thermometer may accidentally poke a hole in (perforate) the rectum. It may also pass on germs from the stool. Always follow the product makers directions for proper use. If you dont feel comfortable taking a rectal temperature, use another method. When you talk to your childs healthcare provider, tell him or her which method you used to take your childs temperature.  Here are guidelines for fever temperature. Ear temperatures arent accurate before 6 months of age. Dont take an oral temperature until your child is at least 4 years old.  Infant under 3 months old:  · Ask your childs healthcare provider how you should take the temperature.  · Rectal or forehead (temporal artery) temperature of 100.4°F (38°C) or higher, or as directed by the provider  · Armpit temperature of 99°F (37.2°C) or higher, or as directed by the provider      Signs of postpartum depression  Its normal to be weepy and tired right after having a baby. These feelings should go away in about a week. If youre still feeling this way, it may be a sign of postpartum depression, a more serious problem. Symptoms may include:  · Feelings of deep sadness  · Gaining or losing a lot of weight  · Sleeping too much or too little  · Feeling tired all the time  · Feeling restless  · Feeling worthless or guilty  · Fearing that your baby will be harmed  · Worrying that youre a bad parent  · Having  trouble thinking clearly or making decisions  · Thinking about death or suicide  If you have any of these symptoms, talk to your OB/GYN or another healthcare provider. Treatment can help you feel better.     Next checkup at: ____2months___________________________     PARENT NOTES:           Date Last Reviewed: 11/1/2016  © 0756-5913 [employer]. [address] [place] [address], [place] [address]. All rights reserved. This information is not intended as a substitute for professional medical care. Always follow your healthcare professional's instructions.                 No

## 2023-12-05 LAB
CULTURE RESULTS: SIGNIFICANT CHANGE UP
CULTURE RESULTS: SIGNIFICANT CHANGE UP
SPECIMEN SOURCE: SIGNIFICANT CHANGE UP
SPECIMEN SOURCE: SIGNIFICANT CHANGE UP

## 2023-12-19 ENCOUNTER — APPOINTMENT (OUTPATIENT)
Dept: INTERNAL MEDICINE | Facility: CLINIC | Age: 28
End: 2023-12-19
Payer: COMMERCIAL

## 2023-12-19 VITALS
WEIGHT: 135 LBS | SYSTOLIC BLOOD PRESSURE: 108 MMHG | OXYGEN SATURATION: 99 % | TEMPERATURE: 98 F | DIASTOLIC BLOOD PRESSURE: 60 MMHG | RESPIRATION RATE: 12 BRPM | BODY MASS INDEX: 25.49 KG/M2 | HEART RATE: 76 BPM | HEIGHT: 61 IN

## 2023-12-19 DIAGNOSIS — T38.0X5A ADVERSE EFFECT OF GLUCOCORTICOIDS AND SYNTHETIC ANALOGUES, INITIAL ENCOUNTER: ICD-10-CM

## 2023-12-19 DIAGNOSIS — Z76.89 PERSONS ENCOUNTERING HEALTH SERVICES IN OTHER SPECIFIED CIRCUMSTANCES: ICD-10-CM

## 2023-12-19 DIAGNOSIS — R45.0 NERVOUSNESS: ICD-10-CM

## 2023-12-19 DIAGNOSIS — M79.604 PAIN IN RIGHT LEG: ICD-10-CM

## 2023-12-19 DIAGNOSIS — M79.605 PAIN IN RIGHT LEG: ICD-10-CM

## 2023-12-19 PROCEDURE — 99203 OFFICE O/P NEW LOW 30 MIN: CPT

## 2023-12-19 NOTE — PLAN
[FreeTextEntry1] : Establish care: advised to fax over pertinent records from prior PCP including labs, immunization, etc. h/o Migraines: Tylenol ok as needed while pregnant, Sumatriptan on hold while pregnant, follows with neurologist h/o Anxiety Disorder: follows with therapist weekly.  Pregnant: following with gyn, on prenatal vitamin   Flu vaccine received  to f/u 4/2024 for CPE

## 2023-12-19 NOTE — HISTORY OF PRESENT ILLNESS
[FreeTextEntry8] : GEMMA LOPEZ is a 27 year old female who presents for new patient focused visit, to establish care with new PCP. She c/o prior episodes of muscle weakness/tingling after taking a course of steroids after COVID as well as after getting the flu vaccine. All resolved at this time, Is currently pregnant, ~2 months along. ISABEL July 2024. Overall feels well. Following with gyn.  H/o Migraine headaches--on Sumatriptan as needed, but now off this due to pregnancy.  Last CPE was in 4/2023 with former PCP (Dr. Maribel Merino).

## 2023-12-28 ENCOUNTER — EMERGENCY (EMERGENCY)
Facility: HOSPITAL | Age: 28
LOS: 1 days | Discharge: HOME | End: 2023-12-28
Attending: STUDENT IN AN ORGANIZED HEALTH CARE EDUCATION/TRAINING PROGRAM
Payer: COMMERCIAL

## 2023-12-28 VITALS
WEIGHT: 136.91 LBS | TEMPERATURE: 99 F | RESPIRATION RATE: 19 BRPM | SYSTOLIC BLOOD PRESSURE: 109 MMHG | DIASTOLIC BLOOD PRESSURE: 65 MMHG | HEART RATE: 106 BPM | OXYGEN SATURATION: 100 %

## 2023-12-28 LAB
ALBUMIN SERPL ELPH-MCNC: 4.3 G/DL — SIGNIFICANT CHANGE UP (ref 3.3–5)
ALBUMIN SERPL ELPH-MCNC: 4.3 G/DL — SIGNIFICANT CHANGE UP (ref 3.3–5)
ALP SERPL-CCNC: 38 U/L — LOW (ref 40–120)
ALP SERPL-CCNC: 38 U/L — LOW (ref 40–120)
ALT FLD-CCNC: 19 U/L — SIGNIFICANT CHANGE UP (ref 10–45)
ALT FLD-CCNC: 19 U/L — SIGNIFICANT CHANGE UP (ref 10–45)
ANION GAP SERPL CALC-SCNC: 16 MMOL/L — SIGNIFICANT CHANGE UP (ref 5–17)
ANION GAP SERPL CALC-SCNC: 16 MMOL/L — SIGNIFICANT CHANGE UP (ref 5–17)
APPEARANCE UR: CLEAR — SIGNIFICANT CHANGE UP
APPEARANCE UR: CLEAR — SIGNIFICANT CHANGE UP
AST SERPL-CCNC: 17 U/L — SIGNIFICANT CHANGE UP (ref 10–40)
AST SERPL-CCNC: 17 U/L — SIGNIFICANT CHANGE UP (ref 10–40)
BACTERIA # UR AUTO: ABNORMAL /HPF
BACTERIA # UR AUTO: ABNORMAL /HPF
BASOPHILS # BLD AUTO: 0.03 K/UL — SIGNIFICANT CHANGE UP (ref 0–0.2)
BASOPHILS # BLD AUTO: 0.03 K/UL — SIGNIFICANT CHANGE UP (ref 0–0.2)
BASOPHILS NFR BLD AUTO: 0.5 % — SIGNIFICANT CHANGE UP (ref 0–2)
BASOPHILS NFR BLD AUTO: 0.5 % — SIGNIFICANT CHANGE UP (ref 0–2)
BILIRUB SERPL-MCNC: 0.2 MG/DL — SIGNIFICANT CHANGE UP (ref 0.2–1.2)
BILIRUB SERPL-MCNC: 0.2 MG/DL — SIGNIFICANT CHANGE UP (ref 0.2–1.2)
BILIRUB UR-MCNC: NEGATIVE — SIGNIFICANT CHANGE UP
BILIRUB UR-MCNC: NEGATIVE — SIGNIFICANT CHANGE UP
BUN SERPL-MCNC: 7 MG/DL — SIGNIFICANT CHANGE UP (ref 7–23)
BUN SERPL-MCNC: 7 MG/DL — SIGNIFICANT CHANGE UP (ref 7–23)
CALCIUM SERPL-MCNC: 9.2 MG/DL — SIGNIFICANT CHANGE UP (ref 8.4–10.5)
CALCIUM SERPL-MCNC: 9.2 MG/DL — SIGNIFICANT CHANGE UP (ref 8.4–10.5)
CAST: 0 /LPF — SIGNIFICANT CHANGE UP (ref 0–4)
CAST: 0 /LPF — SIGNIFICANT CHANGE UP (ref 0–4)
CHLORIDE SERPL-SCNC: 99 MMOL/L — SIGNIFICANT CHANGE UP (ref 96–108)
CHLORIDE SERPL-SCNC: 99 MMOL/L — SIGNIFICANT CHANGE UP (ref 96–108)
CO2 SERPL-SCNC: 21 MMOL/L — LOW (ref 22–31)
CO2 SERPL-SCNC: 21 MMOL/L — LOW (ref 22–31)
COLOR SPEC: YELLOW — SIGNIFICANT CHANGE UP
COLOR SPEC: YELLOW — SIGNIFICANT CHANGE UP
CREAT SERPL-MCNC: 0.54 MG/DL — SIGNIFICANT CHANGE UP (ref 0.5–1.3)
CREAT SERPL-MCNC: 0.54 MG/DL — SIGNIFICANT CHANGE UP (ref 0.5–1.3)
DIFF PNL FLD: NEGATIVE — SIGNIFICANT CHANGE UP
DIFF PNL FLD: NEGATIVE — SIGNIFICANT CHANGE UP
EGFR: 127 ML/MIN/1.73M2 — SIGNIFICANT CHANGE UP
EGFR: 127 ML/MIN/1.73M2 — SIGNIFICANT CHANGE UP
EOSINOPHIL # BLD AUTO: 0.15 K/UL — SIGNIFICANT CHANGE UP (ref 0–0.5)
EOSINOPHIL # BLD AUTO: 0.15 K/UL — SIGNIFICANT CHANGE UP (ref 0–0.5)
EOSINOPHIL NFR BLD AUTO: 2.3 % — SIGNIFICANT CHANGE UP (ref 0–6)
EOSINOPHIL NFR BLD AUTO: 2.3 % — SIGNIFICANT CHANGE UP (ref 0–6)
GLUCOSE SERPL-MCNC: 94 MG/DL — SIGNIFICANT CHANGE UP (ref 70–99)
GLUCOSE SERPL-MCNC: 94 MG/DL — SIGNIFICANT CHANGE UP (ref 70–99)
GLUCOSE UR QL: NEGATIVE MG/DL — SIGNIFICANT CHANGE UP
GLUCOSE UR QL: NEGATIVE MG/DL — SIGNIFICANT CHANGE UP
HCG SERPL-ACNC: HIGH MIU/ML
HCG SERPL-ACNC: HIGH MIU/ML
HCT VFR BLD CALC: 34.2 % — LOW (ref 34.5–45)
HCT VFR BLD CALC: 34.2 % — LOW (ref 34.5–45)
HGB BLD-MCNC: 11.7 G/DL — SIGNIFICANT CHANGE UP (ref 11.5–15.5)
HGB BLD-MCNC: 11.7 G/DL — SIGNIFICANT CHANGE UP (ref 11.5–15.5)
IMM GRANULOCYTES NFR BLD AUTO: 0.8 % — SIGNIFICANT CHANGE UP (ref 0–0.9)
IMM GRANULOCYTES NFR BLD AUTO: 0.8 % — SIGNIFICANT CHANGE UP (ref 0–0.9)
KETONES UR-MCNC: 40 MG/DL
KETONES UR-MCNC: 40 MG/DL
LEUKOCYTE ESTERASE UR-ACNC: ABNORMAL
LEUKOCYTE ESTERASE UR-ACNC: ABNORMAL
LYMPHOCYTES # BLD AUTO: 0.52 K/UL — LOW (ref 1–3.3)
LYMPHOCYTES # BLD AUTO: 0.52 K/UL — LOW (ref 1–3.3)
LYMPHOCYTES # BLD AUTO: 7.8 % — LOW (ref 13–44)
LYMPHOCYTES # BLD AUTO: 7.8 % — LOW (ref 13–44)
MAGNESIUM SERPL-MCNC: 1.7 MG/DL — SIGNIFICANT CHANGE UP (ref 1.6–2.6)
MAGNESIUM SERPL-MCNC: 1.7 MG/DL — SIGNIFICANT CHANGE UP (ref 1.6–2.6)
MCHC RBC-ENTMCNC: 31.7 PG — SIGNIFICANT CHANGE UP (ref 27–34)
MCHC RBC-ENTMCNC: 31.7 PG — SIGNIFICANT CHANGE UP (ref 27–34)
MCHC RBC-ENTMCNC: 34.2 GM/DL — SIGNIFICANT CHANGE UP (ref 32–36)
MCHC RBC-ENTMCNC: 34.2 GM/DL — SIGNIFICANT CHANGE UP (ref 32–36)
MCV RBC AUTO: 92.7 FL — SIGNIFICANT CHANGE UP (ref 80–100)
MCV RBC AUTO: 92.7 FL — SIGNIFICANT CHANGE UP (ref 80–100)
MONOCYTES # BLD AUTO: 0.72 K/UL — SIGNIFICANT CHANGE UP (ref 0–0.9)
MONOCYTES # BLD AUTO: 0.72 K/UL — SIGNIFICANT CHANGE UP (ref 0–0.9)
MONOCYTES NFR BLD AUTO: 10.8 % — SIGNIFICANT CHANGE UP (ref 2–14)
MONOCYTES NFR BLD AUTO: 10.8 % — SIGNIFICANT CHANGE UP (ref 2–14)
NEUTROPHILS # BLD AUTO: 5.17 K/UL — SIGNIFICANT CHANGE UP (ref 1.8–7.4)
NEUTROPHILS # BLD AUTO: 5.17 K/UL — SIGNIFICANT CHANGE UP (ref 1.8–7.4)
NEUTROPHILS NFR BLD AUTO: 77.8 % — HIGH (ref 43–77)
NEUTROPHILS NFR BLD AUTO: 77.8 % — HIGH (ref 43–77)
NITRITE UR-MCNC: NEGATIVE — SIGNIFICANT CHANGE UP
NITRITE UR-MCNC: NEGATIVE — SIGNIFICANT CHANGE UP
NRBC # BLD: 0 /100 WBCS — SIGNIFICANT CHANGE UP (ref 0–0)
NRBC # BLD: 0 /100 WBCS — SIGNIFICANT CHANGE UP (ref 0–0)
PH UR: 6.5 — SIGNIFICANT CHANGE UP (ref 5–8)
PH UR: 6.5 — SIGNIFICANT CHANGE UP (ref 5–8)
PHOSPHATE SERPL-MCNC: 3 MG/DL — SIGNIFICANT CHANGE UP (ref 2.5–4.5)
PHOSPHATE SERPL-MCNC: 3 MG/DL — SIGNIFICANT CHANGE UP (ref 2.5–4.5)
PLATELET # BLD AUTO: 206 K/UL — SIGNIFICANT CHANGE UP (ref 150–400)
PLATELET # BLD AUTO: 206 K/UL — SIGNIFICANT CHANGE UP (ref 150–400)
POTASSIUM SERPL-MCNC: 3.5 MMOL/L — SIGNIFICANT CHANGE UP (ref 3.5–5.3)
POTASSIUM SERPL-MCNC: 3.5 MMOL/L — SIGNIFICANT CHANGE UP (ref 3.5–5.3)
POTASSIUM SERPL-SCNC: 3.5 MMOL/L — SIGNIFICANT CHANGE UP (ref 3.5–5.3)
POTASSIUM SERPL-SCNC: 3.5 MMOL/L — SIGNIFICANT CHANGE UP (ref 3.5–5.3)
PROT SERPL-MCNC: 7.7 G/DL — SIGNIFICANT CHANGE UP (ref 6–8.3)
PROT SERPL-MCNC: 7.7 G/DL — SIGNIFICANT CHANGE UP (ref 6–8.3)
PROT UR-MCNC: NEGATIVE MG/DL — SIGNIFICANT CHANGE UP
PROT UR-MCNC: NEGATIVE MG/DL — SIGNIFICANT CHANGE UP
RBC # BLD: 3.69 M/UL — LOW (ref 3.8–5.2)
RBC # BLD: 3.69 M/UL — LOW (ref 3.8–5.2)
RBC # FLD: 12.7 % — SIGNIFICANT CHANGE UP (ref 10.3–14.5)
RBC # FLD: 12.7 % — SIGNIFICANT CHANGE UP (ref 10.3–14.5)
RBC CASTS # UR COMP ASSIST: 4 /HPF — SIGNIFICANT CHANGE UP (ref 0–4)
RBC CASTS # UR COMP ASSIST: 4 /HPF — SIGNIFICANT CHANGE UP (ref 0–4)
SODIUM SERPL-SCNC: 136 MMOL/L — SIGNIFICANT CHANGE UP (ref 135–145)
SODIUM SERPL-SCNC: 136 MMOL/L — SIGNIFICANT CHANGE UP (ref 135–145)
SP GR SPEC: 1.02 — SIGNIFICANT CHANGE UP (ref 1–1.03)
SP GR SPEC: 1.02 — SIGNIFICANT CHANGE UP (ref 1–1.03)
SQUAMOUS # UR AUTO: 5 /HPF — SIGNIFICANT CHANGE UP (ref 0–5)
SQUAMOUS # UR AUTO: 5 /HPF — SIGNIFICANT CHANGE UP (ref 0–5)
UROBILINOGEN FLD QL: 0.2 MG/DL — SIGNIFICANT CHANGE UP (ref 0.2–1)
UROBILINOGEN FLD QL: 0.2 MG/DL — SIGNIFICANT CHANGE UP (ref 0.2–1)
WBC # BLD: 6.64 K/UL — SIGNIFICANT CHANGE UP (ref 3.8–10.5)
WBC # BLD: 6.64 K/UL — SIGNIFICANT CHANGE UP (ref 3.8–10.5)
WBC # FLD AUTO: 6.64 K/UL — SIGNIFICANT CHANGE UP (ref 3.8–10.5)
WBC # FLD AUTO: 6.64 K/UL — SIGNIFICANT CHANGE UP (ref 3.8–10.5)
WBC UR QL: 7 /HPF — HIGH (ref 0–5)
WBC UR QL: 7 /HPF — HIGH (ref 0–5)

## 2023-12-28 PROCEDURE — 99223 1ST HOSP IP/OBS HIGH 75: CPT

## 2023-12-28 PROCEDURE — 76830 TRANSVAGINAL US NON-OB: CPT | Mod: 26

## 2023-12-28 RX ORDER — METOCLOPRAMIDE HCL 10 MG
10 TABLET ORAL ONCE
Refills: 0 | Status: COMPLETED | OUTPATIENT
Start: 2023-12-28 | End: 2023-12-28

## 2023-12-28 RX ORDER — ACETAMINOPHEN 500 MG
975 TABLET ORAL ONCE
Refills: 0 | Status: COMPLETED | OUTPATIENT
Start: 2023-12-28 | End: 2023-12-28

## 2023-12-28 RX ORDER — SODIUM CHLORIDE 9 MG/ML
1000 INJECTION, SOLUTION INTRAVENOUS
Refills: 0 | Status: COMPLETED | OUTPATIENT
Start: 2023-12-28 | End: 2023-12-28

## 2023-12-28 RX ORDER — ACETAMINOPHEN 500 MG
975 TABLET ORAL EVERY 6 HOURS
Refills: 0 | Status: DISCONTINUED | OUTPATIENT
Start: 2023-12-28 | End: 2023-12-31

## 2023-12-28 RX ORDER — SODIUM CHLORIDE 9 MG/ML
1000 INJECTION INTRAMUSCULAR; INTRAVENOUS; SUBCUTANEOUS ONCE
Refills: 0 | Status: COMPLETED | OUTPATIENT
Start: 2023-12-28 | End: 2023-12-28

## 2023-12-28 RX ORDER — CEFTRIAXONE 500 MG/1
1000 INJECTION, POWDER, FOR SOLUTION INTRAMUSCULAR; INTRAVENOUS ONCE
Refills: 0 | Status: COMPLETED | OUTPATIENT
Start: 2023-12-29 | End: 2023-12-29

## 2023-12-28 RX ORDER — CEFTRIAXONE 500 MG/1
1000 INJECTION, POWDER, FOR SOLUTION INTRAMUSCULAR; INTRAVENOUS ONCE
Refills: 0 | Status: COMPLETED | OUTPATIENT
Start: 2023-12-28 | End: 2023-12-28

## 2023-12-28 RX ORDER — ACETAMINOPHEN 500 MG
1000 TABLET ORAL ONCE
Refills: 0 | Status: COMPLETED | OUTPATIENT
Start: 2023-12-28 | End: 2023-12-28

## 2023-12-28 RX ADMIN — Medication 400 MILLIGRAM(S): at 06:56

## 2023-12-28 RX ADMIN — Medication 975 MILLIGRAM(S): at 15:06

## 2023-12-28 RX ADMIN — SODIUM CHLORIDE 125 MILLILITER(S): 9 INJECTION, SOLUTION INTRAVENOUS at 17:21

## 2023-12-28 RX ADMIN — CEFTRIAXONE 100 MILLIGRAM(S): 500 INJECTION, POWDER, FOR SOLUTION INTRAMUSCULAR; INTRAVENOUS at 12:10

## 2023-12-28 RX ADMIN — Medication 975 MILLIGRAM(S): at 15:49

## 2023-12-28 RX ADMIN — SODIUM CHLORIDE 1000 MILLILITER(S): 9 INJECTION INTRAMUSCULAR; INTRAVENOUS; SUBCUTANEOUS at 06:57

## 2023-12-28 NOTE — ED ADULT NURSE NOTE - OBJECTIVE STATEMENT
29 y/o  F with no significant PMHx presents to the ED at approximately 10 weeks gestation with complaints of flu. Patient states she tested positive for flu A 4 days ago. Patient states she was advised to present to the ED for worsening sxs by OB RN. Patient reports cough, congestion, and runny nose. States she has been unable to tolerate PO x 2 days and vomiting. Patient has taken tylenol for sxs w/ mild improvement. Notes last Tylenol was taken at 0000, 500 mg. Denies chest pain, SOB. AOx4 and speaking coherently. Breathing is unlabored, spontaneous, and symmetrical. Tachycardiac. Abdomen and bladder are nondistended. No peripheral edema noted. <2s capillary refill. Ambulatory with full ROM of all extremities. 27 y/o  F with no significant PMHx presents to the ED at approximately 10 weeks gestation with complaints of flu. Patient states she tested positive for flu A 4 days ago. Patient states she was advised to present to the ED for worsening sxs by OB RN. Patient reports cough, congestion, and runny nose. States she has been unable to tolerate PO x 2 days and vomiting. Patient has taken tylenol for sxs w/ mild improvement. Notes last Tylenol was taken at 0000, 500 mg. Denies chest pain, SOB. AOx4 and speaking coherently. Breathing is unlabored, spontaneous, and symmetrical. Tachycardiac. Abdomen and bladder are nondistended. No peripheral edema noted. <2s capillary refill. Ambulatory with full ROM of all extremities.

## 2023-12-28 NOTE — ED ADULT NURSE REASSESSMENT NOTE - NS ED NURSE REASSESS COMMENT FT1
Report received from YOEL Ambrocio Pt AAOx4, NAD, resp nonlabored, skin warm/dry, Pt c/o Ha, nausea . Pt denies, dizziness, chest pain, palpitations, SOB, abd pain, v/d, urinary symptoms, fevers, chills, weakness at this time. Pt awaiting for observation  . Safety maintained with call bell within reach.

## 2023-12-28 NOTE — ED PROVIDER NOTE - CARE PLAN
Principal Discharge DX:	Viral syndrome   1 Principal Discharge DX:	Viral syndrome  Secondary Diagnosis:	Acute UTI

## 2023-12-28 NOTE — ED CDU PROVIDER INITIAL DAY NOTE - PROGRESS NOTE DETAILS
Pt received at sign out, resting comfortably, VSS and afebrile. Tolerating liquids. Aware she tested positive for the flu and possible UTI. Pt received IV abx. Was seen by OB attending who agrees with plan. Cordell Saez PA-C

## 2023-12-28 NOTE — ED ADULT NURSE REASSESSMENT NOTE - NS ED NURSE REASSESS COMMENT FT1
Pt received from ADE Jeronimo at 19:00hrs. Pt A&O x 4. Pt in CDU for PO challenge, and IV antibiotics . Pt denies any chest pain, SOB, dizziness or palpitations. V/S stable, pt afebrile at this time, IV patent and free of signs of infiltration. Pt resting in bed. Safety & comfort measures maintained. Family at bed side. Educated pt/family to call for assistance as needed. Call bell in reach. Will continue to monitor.

## 2023-12-28 NOTE — ED PROVIDER NOTE - OBJECTIVE STATEMENT
28-year-old female with no significant past medical history, G2, P1 presenting with chief complaint of bodyaches, congestion, cough, headache, nausea without vomiting.    Confirmed intrauterine. Patient was flu positive at an urgent care, has been having symptoms for over 4 days.  Spoke to her OB doctor who told her to come to the ER if any symptoms worsen, states that she was out of the window for Tamiflu, was not given.  Patient denies any abdominal pain, chest pain, shortness of breath, vaginal bleeding or loss of fluids.  Has been cautious about taking any medication secondary to pregnancy.

## 2023-12-28 NOTE — ED ADULT NURSE NOTE - CAS EDP DISCH TYPE
We keep getting these faxes from [facility] about these medication. Duloxetine 30 mg cap and the 60 mg cap can you please look into this for the patient. Will send this over to the nurses mail box. 5/15/20    Home

## 2023-12-28 NOTE — PROGRESS NOTE ADULT - SUBJECTIVE AND OBJECTIVE BOX
Pt seen and examined in the ED. Pt presented to the ED early this morning with +flu symptoms and +flu test at urgent care. Patient with HA and congestion. She was given IV ceftriaxone by the ED for fever in the setting of suspicious UA.       Vital Signs Last 24 Hrs  T(C): 36.9 (28 Dec 2023 12:15), Max: 38.4 (28 Dec 2023 06:43)  T(F): 98.5 (28 Dec 2023 12:15), Max: 101.1 (28 Dec 2023 06:43)  HR: 98 (28 Dec 2023 12:15) (98 - 118)  BP: 99/68 (28 Dec 2023 12:15) (99/68 - 115/72)  BP(mean): 78 (28 Dec 2023 12:15) (69 - 82)  RR: 18 (28 Dec 2023 12:15) (18 - 20)  SpO2: 99% (28 Dec 2023 12:15) (97% - 100%)    Parameters below as of 28 Dec 2023 12:15  Patient On (Oxygen Delivery Method): room air    Gen: NAD  Chest: breathing comfortably on room air  Abd: non-tender, no CVA tenderness  Ext: atraumatic

## 2023-12-28 NOTE — ED CDU PROVIDER DISPOSITION NOTE - CLINICAL COURSE
28-year-old female with no significant past medical history, G2, P1 presenting with chief complaint of bodyaches, congestion, cough, headache, nausea without vomiting. Confirmed intrauterine. Patient was flu positive at an urgent care, has been having symptoms for over 4 days.  Spoke to her OB doctor who told her to come to the ER if any symptoms worsen, states that she was out of the window for Tamiflu, was not given.  Patient denies any abdominal pain, chest pain, shortness of breath, vaginal bleeding or loss of fluids.  Has been cautious about taking any medication secondary to pregnancy.  In the ED VSS.  Labs unremarkable.  UA with few bacteria.  Given ceftriaxone with plan to observe in the CDU overnight, IVF, monitor fever curve.  In the CDU*** 28-year-old female with no significant past medical history, G2, P1 presenting with chief complaint of bodyaches, congestion, cough, headache, nausea without vomiting. Confirmed intrauterine. Patient was flu positive at an urgent care, has been having symptoms for over 4 days.  Spoke to her OB doctor who told her to come to the ER if any symptoms worsen, states that she was out of the window for Tamiflu, was not given.  Patient denies any abdominal pain, chest pain, shortness of breath, vaginal bleeding or loss of fluids.  Has been cautious about taking any medication secondary to pregnancy.  In the ED VSS.  Labs unremarkable.  UA with few bacteria.  Given ceftriaxone with plan to observe in the CDU overnight, IVF, monitor fever curve.  In the CDU, pt feeling improved, tolerating PO, stable for d/c on PO abx. 28-year-old female with no significant past medical history, G2, P1 presenting with chief complaint of bodyaches, congestion, cough, headache, nausea without vomiting. Confirmed intrauterine. Patient was flu positive at an urgent care, has been having symptoms for over 4 days.  Spoke to her OB doctor who told her to come to the ER if any symptoms worsen, states that she was out of the window for Tamiflu, was not given.  Patient denies any abdominal pain, chest pain, shortness of breath, vaginal bleeding or loss of fluids.  Has been cautious about taking any medication secondary to pregnancy.  In the ED VSS.  Labs unremarkable.  UA with few bacteria.  Given ceftriaxone with plan to observe in the CDU overnight, IVF, monitor fever curve.  In the CDU, pt feeling improved, tolerating PO, stable for d/c on PO abx. D/w OBGYN.

## 2023-12-28 NOTE — ED PROVIDER NOTE - NSFOLLOWUPINSTRUCTIONS_ED_ALL_ED_FT
See had attached information on influenza and pregnancy.      Return to the emergency room for any new or worsening symptoms.      Follow-up with your OB doctor as discussed.      You can take 650 mg of Tylenol up to 4 times a day as discussed.    Pregnancy and Influenza    Influenza, also called the flu, is an infection of the lungs and airways (respiratory tract). If you are pregnant, you are more likely to catch the flu. You are also more likely to have serious illness from the flu. This is because pregnancy causes changes to your body's disease-fighting system (immune system), heart, and lungs. If you develop serious illness from the flu, this can cause problems for you and your developing baby.    How do people get the flu?  The flu is caused by a type of germ called a virus. It spreads when virus particles get passed from person to person by:  Being near a sick person who is coughing or sneezing.  Touching something that has the virus on it and then touching your mouth, nose, or face.  The influenza virus is most common during the fall and winter.    What actions can I take to protect myself against the flu?    Get a flu shot. The best way to prevent the flu is to get a flu shot before flu season starts. The flu shot is not dangerous for your developing baby. It may even help protect your baby from the flu for up to 6 months after birth.  Wash your hands often with soap and warm water for at least 20 seconds. If soap and water are not available, use alcohol-based hand .  Do not come in close contact with sick people.  Do not share food, drinks, or utensils with other people.  Avoid touching your eyes, nose, and mouth.  Clean frequently used surfaces at home, school, or work.  Practice healthy lifestyle habits, such as:  Eating a healthy, balanced diet.  Drinking plenty of fluids.  Exercising regularly or as told by your health care provider.  Sleeping 7–9 hours each night.  Finding ways to manage stress.  What should I do if I have flu symptoms?    If you have any symptoms of the flu, even after getting a flu shot, contact your health care provider right away.  To reduce fever, take over-the-counter acetaminophen as told by your health care provider.  If you have the flu, your health care provider may give you antiviral medicine to keep the flu from becoming severe and to shorten how long it lasts.  Avoid spreading the flu to others:  Stay home until you are well.  Cover your nose and mouth when you cough or sneeze.  Wash your hands often.  Follow these instructions at home:  Take over-the-counter and prescription medicines only as told by your health care provider. Do not take any medicine, including cold or flu medicine, unless your health care provider tells you to do so.  If you were prescribed antiviral medicine, take it as told by your health care provider. Do not stop taking the antiviral medicine even if you start to feel better.  Eat a nutrient-rich diet that includes fresh fruits and vegetables, whole grains, lean protein, and low-fat dairy.  Drink enough fluid to keep your urine clear or pale yellow.  Get plenty of rest.  Keep all follow-up visits. This is important.  Contact a health care provider if:  You have a fever or chills.  You have a cough, sore throat, or stuffy nose.  You have worsening or unusual muscle aches, headache, tiredness, or loss of appetite.  You have vomiting or diarrhea.  Get help right away if:  You have trouble breathing.  You have chest pain.  You have abdominal pain.  You begin to have labor pains.  You do not feel your baby move.  You have diarrhea or vomiting that will not go away.  You have dizziness or confusion.  Your symptoms do not improve, even with treatment.  These symptoms may represent a serious problem that is an emergency. Do not wait to see if the symptoms will go away. Get medical help right away. Call your local emergency services (911 in the [place] Do not drive yourself to the hospital.    Summary  If you are pregnant, you are more likely to catch the flu. You are also more likely to have a more serious case of the flu.  If you have flu-like symptoms, call your health care provider right away. If you develop serious illness from the flu, this can cause problems for you and your developing baby.  The best way to prevent the flu is to get a flu shot before flu season starts. The flu shot is safe during pregnancy and not dangerous for your developing baby.  If you have the flu and were prescribed antiviral medicine, take it as told by your health care provider.  This information is not intended to replace advice given to you by your health care provider. Make sure you discuss any questions you have with your health care provider.

## 2023-12-28 NOTE — ED CDU PROVIDER INITIAL DAY NOTE - CLINICAL SUMMARY MEDICAL DECISION MAKING FREE TEXT BOX
Attending Ryanne Rosa: 28-year-old female presenting with decreased p.o. fatigue fevers.  Patient found to be flu positive.  UA concerning for possible infectious etiology.  Patient is currently pregnant.  Placed in the CDU for antibiotics, antipyretics and fluids.

## 2023-12-28 NOTE — ED ADULT NURSE NOTE - NSFALLUNIVINTERV_ED_ALL_ED
Bed/Stretcher in lowest position, wheels locked, appropriate side rails in place/Call bell, personal items and telephone in reach/Instruct patient to call for assistance before getting out of bed/chair/stretcher/Non-slip footwear applied when patient is off stretcher/[place] to call system/Physically safe environment - no spills, clutter or unnecessary equipment/Purposeful proactive rounding/Room/bathroom lighting operational, light cord in reach Bed/Stretcher in lowest position, wheels locked, appropriate side rails in place/Call bell, personal items and telephone in reach/Instruct patient to call for assistance before getting out of bed/chair/stretcher/Non-slip footwear applied when patient is off stretcher/[place] to call system/Physically safe environment - no spills, clutter or unnecessary equipment/Purposeful proactive rounding/Room/bathroom lighting operational, light cord in reach

## 2023-12-28 NOTE — ED CDU PROVIDER DISPOSITION NOTE - ATTENDING APP SHARED VISIT CONTRIBUTION OF CARE
Attending MD Lopez: I personally made/approved the management plan and take responsibility for the patient management.

## 2023-12-28 NOTE — ED CDU PROVIDER DISPOSITION NOTE - CARE PROVIDER_API CALL
Cassie Brown  Obstetrics and Gynecology  [address], [address]  [place] [address]  Phone: [phone]  Fax: [phone]  Follow Up Time:    Cassie Brown  Obstetrics and Gynecology  [address], [address]  [place] [address]  Phone: [phone]  Fax: [phone]  Follow Up Time:

## 2023-12-28 NOTE — ED ADULT TRIAGE NOTE - CHIEF COMPLAINT QUOTE
fevers, cough and sinus congestion x sunday.   (+) flu at  yesterday  pt 10 wks pregnant.  Denies chest pain, abdominal pain, vag bleed.

## 2023-12-28 NOTE — ED PROVIDER NOTE - OBSERVING MD:
Dr. bear worthington Methotrexate Pregnancy And Lactation Text: This medication is Pregnancy Category X and is known to cause fetal harm. This medication is excreted in breast milk.

## 2023-12-28 NOTE — ED ADULT NURSE REASSESSMENT NOTE - NS ED NURSE REASSESS COMMENT FT1
Report received from Fadumo BOOKER. Patient seen on phone in stretcher comfortable. Patient awaiting to go to CDU. Food and hydration provided. Abx infusing at this time. Safety and comfort provided.

## 2023-12-28 NOTE — ED CDU PROVIDER INITIAL DAY NOTE - ATTENDING SHARED VISIT SELECTOR YES
[facility]  Progress Note  Name: Leonardo Oviedo I  MRN: [id number]  Unit/Bed#: [place] [address] -01 I Date of Admission: 12/7/2023   Date of Service: 12/11/2023 I Hospital Day: 4    Assessment/Plan   * CHF exacerbation (HCC)  Assessment & Plan  Wt Readings from Last 3 Encounters:   12/11/23 (!) 141 kg (310 lb 13.6 oz)   12/07/23 (!) 146 kg (322 lb 3.2 oz)   11/20/23 (!) 147 kg (324 lb)     Patient with progressive lower extremity edema. States worsened after recent discharge with steroids for suspected PNA  Possible acute on chronic diastolic heart failure  BNP on admission: 91  Continue IV diuresis with goal of net -1 L  Was on lasix 60mg BID, increase to 80mg BID today  BNP not markedly elevated but can be falsely low in patients with obesity  Cardiology recommendations reviewed  TTE 10/11/23: LVEF 65%, grade 2 diastolic dysfunction   Approximately 20 pound weight gain  Down approximately 14 lb since admission with net output of 9.3L  Dry weight approximately 302lbs        Venous ulcer (HCC)  Assessment & Plan  Continue localized wound care  Leg elevation  Wound care consulted    Bilateral lower extremity edema  Assessment & Plan  Most likely due to heart failure with addition of venous insufficiency and lymphedema.   Patient uses compression stockings at home  Combination of lymphedema as well as diastolic heart failure      Atrial fibrillation (HCC)  Assessment & Plan  Ventricular rate controlled  Continue metoprolol  Anticoagulated with Xarelto    Obstructive sleep apnea on CPAP-resolved as of 12/8/2023  Assessment & Plan  Noncompliant with CPAP             VTE Pharmacologic Prophylaxis:   Moderate Risk (Score 3-4) - Pharmacological DVT Prophylaxis Ordered: rivaroxaban (Xarelto).    Mobility:   Basic Mobility Inpatient Raw Score: 24  JH-HLM Goal: 8: Walk 250 feet or more  JH-HLM Achieved: 8: Walk 250 feet ot more  HLM Goal achieved. Continue to encourage appropriate  mobility.    Patient Centered Rounds: I performed bedside rounds with nursing staff today.   Discussions with Specialists or Other Care Team Provider: None    Education and Discussions with Family / Patient: Patient declined call to .     Total Time Spent on Date of Encounter in care of patient: 25 mins. This time was spent on one or more of the following: performing physical exam; counseling and coordination of care; obtaining or reviewing history; documenting in the medical record; reviewing/ordering tests, medications or procedures; communicating with other healthcare professionals and discussing with patient's family/caregivers.    Current Length of Stay: 4 day(s)  Current Patient Status: Inpatient   Certification Statement: The patient will continue to require additional inpatient hospital stay due to diuresis  Discharge Plan: Anticipate discharge in 24-48 hrs to home.    Code Status: Level 1 - Full Code    Subjective:   The patient is seen resting in bed. He states he is feeling great today, was able to ambulate around the halls yesterday with minimal difficulty. He denies  any worsening SOB, or chest pain.    Objective:     Vitals:   Temp (24hrs), Av.1 °F (36.7 °C), Min:98 °F (36.7 °C), Max:98.3 °F (36.8 °C)    Temp:  [98 °F (36.7 °C)-98.3 °F (36.8 °C)] 98.3 °F (36.8 °C)  HR:  [69-78] 77  Resp:  [17-20] 20  BP: (106-146)/(48-76) 146/76  SpO2:  [76 %-94 %] 92 %  Body mass index is 45.9 kg/m².     Input and Output Summary (last 24 hours):     Intake/Output Summary (Last 24 hours) at 2023 1054  Last data filed at 2023 0530  Gross per 24 hour   Intake --   Output 1600 ml   Net -1600 ml       Physical Exam:   Physical Exam  Vitals and nursing note reviewed.   Constitutional:       General: He is not in acute distress.     Appearance: He is obese. He is not ill-appearing, toxic-appearing or diaphoretic.   HENT:      Head: Normocephalic and atraumatic.   Cardiovascular:      Rate and  Rhythm: Normal rate and regular rhythm.      Heart sounds: No murmur heard.     No friction rub. No gallop.   Pulmonary:      Effort: Pulmonary effort is normal. No respiratory distress.      Breath sounds: Normal breath sounds. No wheezing, rhonchi or rales.   Abdominal:      General: Abdomen is flat. Bowel sounds are normal. There is no distension.      Palpations: Abdomen is soft.      Tenderness: There is no abdominal tenderness.   Musculoskeletal:      Right lower leg: Edema present.      Left lower leg: Edema present.   Skin:     General: Skin is warm and dry.      Coloration: Skin is not jaundiced or pale.   Neurological:      General: No focal deficit present.      Mental Status: He is alert. Mental status is at baseline.          Additional Data:     Labs:  Results from last 7 days   Lab Units 12/11/23  0526 12/08/23  0934 12/07/23  1123   WBC Thousand/uL 4.31   < > 4.44   HEMOGLOBIN g/dL 12.4   < > 11.7*   HEMATOCRIT % 38.3   < > 35.6*   PLATELETS Thousands/uL 164   < > 134*   NEUTROS PCT %  --   --  66   LYMPHS PCT %  --   --  23   MONOS PCT %  --   --  9   EOS PCT %  --   --  2    < > = values in this interval not displayed.     Results from last 7 days   Lab Units 12/11/23  0526   SODIUM mmol/L 138   POTASSIUM mmol/L 3.9   CHLORIDE mmol/L 100   CO2 mmol/L 34*   BUN mg/dL 20   CREATININE mg/dL 0.81   ANION GAP mmol/L 4   CALCIUM mg/dL 9.3   GLUCOSE RANDOM mg/dL 102                       Lines/Drains:  Invasive Devices       Peripheral Intravenous Line  Duration             Peripheral IV 12/07/23 Dorsal (posterior);Left Hand 3 days                          Imaging: No pertinent imaging reviewed.    Recent Cultures (last 7 days):         Last 24 Hours Medication List:   Current Facility-Administered Medications   Medication Dose Route Frequency Provider Last Rate    acetaminophen  650 mg Oral Q4H PRN Cristiana Choudhary PA-C      allopurinol  300 mg Oral Daily Cristiana Choudhary PA-C      calcium carbonate   500 mg Oral Daily PRN Wlaly Ca PA-C      ferrous sulfate  325 mg Oral Daily With Breakfast Cristiana Choudhary PA-C      finasteride  5 mg Oral Daily Cristiana Choudhary PA-C      furosemide  80 mg Intravenous BID (diuretic) NONA Adams      metoprolol succinate  25 mg Oral Daily Cristiana Choudhary PA-C      ondansetron  4 mg Intravenous Q6H PRN Cristiana Choudhary PA-C      rivaroxaban  20 mg Oral Daily Cristiana Choudhary PA-C      spironolactone  25 mg Oral BID Denys Viveros, DO          Today, Patient Was Seen By: Brandyn Paredes PA-C    **Please Note: This note may have been constructed using a voice recognition system.**   Yes

## 2023-12-28 NOTE — ED CDU PROVIDER DISPOSITION NOTE - PATIENT PORTAL LINK FT
You can access the FollowMyHealth Patient Portal offered by [facility] by registering at the following website: [email or web address]. By joining [employer]’s FollowMyHealth portal, you will also be able to view your health information using other applications (apps) compatible with our system. You can access the FollowMyHealth Patient Portal offered by [facility] by registering at the following website: [email or web address]. By joining [employer]’s FollowMyHealth portal, you will also be able to view your health information using other applications (apps) compatible with our system.

## 2023-12-28 NOTE — ED PROVIDER NOTE - PROGRESS NOTE DETAILS
Attending Ryanne Rosa: pt feeling better. headache resolved. CHRIS Steiner: patient UA + bacteria will give IV ceftriaxone and monitor in CDU as pt was febrile

## 2023-12-28 NOTE — ED ADULT NURSE NOTE - ED STAT RN HANDOFF DETAILS 3
Report given to receiving change of shift YOEL Chowdary  patient is in no acute distress. Patient vital signs stable, plan of care explained.

## 2023-12-28 NOTE — ED PROVIDER NOTE - CLINICAL SUMMARY MEDICAL DECISION MAKING FREE TEXT BOX
28-year-old female  approximately 10 weeks pregnant, confirmed intrauterine presenting with chief complaint of flulike symptoms.  On exam, febrile and mildly tachycardic.  No other focal findings.  Plan for labs and symptomatic relief.  Will reassess to dispo.  Likely DC with OB follow-up.

## 2023-12-28 NOTE — ED CDU PROVIDER DISPOSITION NOTE - NSFOLLOWUPINSTRUCTIONS_ED_ALL_ED_FT
Please make sure to follow up with your primary care doctor within 1-2 days and with the OBGYN specialist. The information for follow up can be found below. Bring a copy of all of your results with you to your follow up appointments.   Return to the ER as discussed if you develop any new or worsening symptoms.     Influenza precautions as discussed.   Begin taking **** for your UTI.   Make sure to rest and adequately hydrate.    Tylenol for fever control as directed. Please make sure to follow up with your primary care doctor within 1-2 days and with the OBGYN specialist. The information for follow up can be found below. Bring a copy of all of your results with you to your follow up appointments.   Return to the ER as discussed if you develop any new or worsening symptoms.     Influenza precautions as discussed.   Begin taking **** for your UTI.   Make sure to rest and adequately hydrate.    Tylenol for fever control as directed.    Cassie Brown  Obstetrics and Gynecology  [address] [place] [address], [address]  [place] [address]  Phone: [phone] Please make sure to follow up with your primary care doctor within 1-2 days and with the OBGYN specialist. The information for follow up can be found below. Bring a copy of all of your results with you to your follow up appointments.   Return to the ER as discussed if you develop any new or worsening symptoms.     Influenza precautions as discussed.   Begin taking **** for your UTI.   Make sure to rest and adequately hydrate.    Tylenol for fever control as directed.    Cassie Brown  Obstetrics and Gynecology  [address] [place] [address], [address]  [place] [address]  Phone: [phone] Please make sure to follow up with your OBGYN 1-2 days the information for follow up can be found below. Bring a copy of all of your results with you to your follow up appointments.     Rest and stay well hydrated.   May take tylenol for fever as needed: 650mg every 8 hours.   Influenza precautions as discussed - please wear a mask and wash hands to prevent spread of virus.   Continue taking antibiotics for your UTI as prescribed.     Cassie Brown  Obstetrics and Gynecology  [address] [place] [address], [address]  [place] [address]  Phone: [phone]    Return to the ER as discussed if you develop any new or worsening symptoms including new abdominal pain, vaginal bleeding, vomiting, unable to keep fluids down, persistent fevers, trouble breathing, or any other concerns. Please make sure to follow up with your OBGYN 1-2 days the information for follow up can be found below. Bring a copy of all of your results with you to your follow up appointments.     Rest and stay well hydrated.   May take tylenol for fever as needed: 650mg every 8 hours.   Influenza precautions as discussed - please wear a mask and wash hands to prevent spread of virus.   Continue taking antibiotics for your UTI as prescribed.     Cassie Brown  Obstetrics and Gynecology  [address] [place] [address], [address]  [place] [address]  Phone: [phone]    Return to the ER as discussed if you develop any new or worsening symptoms including new abdominal pain, vaginal bleeding, vomiting, unable to keep fluids down, persistent fevers, trouble breathing, or any other concerns.

## 2023-12-28 NOTE — PROGRESS NOTE ADULT - ASSESSMENT
27 yo  at 10w5d admitted for observation in the setting of +flu and suspected UTI. Patient receiving IV antibiotics for suspected UTI. No CVA tenderness. Low suspicion for pyelonephritis at this time. Fever likely secondary to +flu. Sono showed +FH. Continue to monitor.  29 yo  at 10w5d admitted for observation in the setting of +flu and suspected UTI. Patient receiving IV antibiotics for suspected UTI. No CVA tenderness. Low suspicion for pyelonephritis at this time. Fever likely secondary to +flu. Sono showed +FH. Continue to monitor.

## 2023-12-28 NOTE — ED CDU PROVIDER INITIAL DAY NOTE - OBJECTIVE STATEMENT
28-year-old female with no significant past medical history, G2, P1 presenting with chief complaint of bodyaches, congestion, cough, headache, nausea without vomiting. Confirmed intrauterine. Patient was flu positive at an urgent care, has been having symptoms for over 4 days.  Spoke to her OB doctor who told her to come to the ER if any symptoms worsen, states that she was out of the window for Tamiflu, was not given.  Patient denies any abdominal pain, chest pain, shortness of breath, vaginal bleeding or loss of fluids.  Has been cautious about taking any medication secondary to pregnancy.    In the ED VSS.  Labs unremarkable.  UA with few bacteria.  Given ceftriaxone with plan to observe in the CDU overnight, IVF, monitor fever curve.

## 2023-12-29 VITALS
DIASTOLIC BLOOD PRESSURE: 64 MMHG | SYSTOLIC BLOOD PRESSURE: 101 MMHG | OXYGEN SATURATION: 100 % | TEMPERATURE: 99 F | RESPIRATION RATE: 16 BRPM | HEART RATE: 84 BPM

## 2023-12-29 PROCEDURE — 85025 COMPLETE CBC W/AUTO DIFF WBC: CPT

## 2023-12-29 PROCEDURE — 76830 TRANSVAGINAL US NON-OB: CPT

## 2023-12-29 PROCEDURE — 84702 CHORIONIC GONADOTROPIN TEST: CPT

## 2023-12-29 PROCEDURE — 96375 TX/PRO/DX INJ NEW DRUG ADDON: CPT

## 2023-12-29 PROCEDURE — 81001 URINALYSIS AUTO W/SCOPE: CPT

## 2023-12-29 PROCEDURE — 80053 COMPREHEN METABOLIC PANEL: CPT

## 2023-12-29 PROCEDURE — 99238 HOSP IP/OBS DSCHRG MGMT 30/<: CPT

## 2023-12-29 PROCEDURE — 84100 ASSAY OF PHOSPHORUS: CPT

## 2023-12-29 PROCEDURE — 99284 EMERGENCY DEPT VISIT MOD MDM: CPT | Mod: 25

## 2023-12-29 PROCEDURE — 96374 THER/PROPH/DIAG INJ IV PUSH: CPT

## 2023-12-29 PROCEDURE — 83735 ASSAY OF MAGNESIUM: CPT

## 2023-12-29 PROCEDURE — 96376 TX/PRO/DX INJ SAME DRUG ADON: CPT

## 2023-12-29 PROCEDURE — G0378: CPT

## 2023-12-29 PROCEDURE — 87086 URINE CULTURE/COLONY COUNT: CPT

## 2023-12-29 RX ORDER — CEPHALEXIN 500 MG
1 CAPSULE ORAL
Qty: 28 | Refills: 0
Start: 2023-12-29 | End: 2024-01-04

## 2023-12-29 RX ADMIN — Medication 975 MILLIGRAM(S): at 01:30

## 2023-12-29 RX ADMIN — CEFTRIAXONE 100 MILLIGRAM(S): 500 INJECTION, POWDER, FOR SOLUTION INTRAMUSCULAR; INTRAVENOUS at 09:53

## 2023-12-29 RX ADMIN — Medication 975 MILLIGRAM(S): at 00:19

## 2023-12-29 RX ADMIN — Medication 1000 MILLIGRAM(S): at 07:11

## 2023-12-29 NOTE — ED CDU PROVIDER SUBSEQUENT DAY NOTE - HISTORY
Patient seen at bedside in NAD.  Upon vitals check pt noted to be febrile and mildly tachycardic likely from fever. Tylenol given. No chills or rigors. Denies any complaints besides body aches. Will continue to monitor. Pt tolerating Pedialyte at bedside. Cordell Saez PA-C

## 2023-12-29 NOTE — ED CDU PROVIDER SUBSEQUENT DAY NOTE - PHYSICAL EXAMINATION
CONSTITUTIONAL: Well appearing and in no apparent distress.  ENT: Airway patent, moist mucous membranes.   EYES: Pupils equal, round and reactive to light. EOMI. Conjunctiva normal appearing.   CARDIAC: Tachycardia, regular rhythm.  Heart sounds S1, S2.    RESPIRATORY: Breath sounds clear and equal bilaterally.   GASTROINTESTINAL: Abdomen soft, non-tender, not distended.  MUSCULOSKELETAL: Spine appears normal.  NEUROLOGICAL: Alert and oriented x3, no focal deficits, no motor or sensory deficits. 5/5 muscle strength throughout.  SKIN: Skin normal color, warm, dry and intact.   PSYCHIATRIC: Normal mood and affect.

## 2023-12-29 NOTE — ED CDU PROVIDER SUBSEQUENT DAY NOTE - CLINICAL SUMMARY MEDICAL DECISION MAKING FREE TEXT BOX
Attending MD Lopez: 28-year-old pregnant woman presenting for evaluation of suspected pyelonephritis.  Recently diagnosed with influenza       INCOMPLETE      *The above represents an initial assessment/impression. Please refer to progress notes for potential changes in patient clinical course* Attending MD Lopez: 28-year-old pregnant woman presenting for evaluation of suspected pyelonephritis.  Recently diagnosed with influenza. Patient overall feels well, still is concerned about persistent fevers.  Fevers at this point are likely related to known influenza diagnosis.  Clinically does not have any signs or symptoms of pyelonephritis.  Patient is hungry this morning which is an improvement for her, will p.o. challenge and if does well patient is appropriate for discharge.  Explained to patient rationale behind why Tamiflu is not being prescribed (symptoms greater than 48 hours).  Currently being treated with IV ceftriaxone, it would be reasonable to continue oral antibiotics as an outpatient even though unlikely to be urinary infection at this time.         *The above represents an initial assessment/impression. Please refer to progress notes for potential changes in patient clinical course*

## 2023-12-29 NOTE — ED CDU PROVIDER SUBSEQUENT DAY NOTE - PROGRESS NOTE DETAILS
Patient seen and evaluated at bedside, resting comfortably, NAD. Reports feeling well, no complaints. Most recent VSS. Last fever at midnight. Plan to PO challenge and likely d/c home this AM. Will d/c on PO abx. D/w Dr. Lopez. Pt remains feeling well, tolerated breakfast, stable for d/c. Dr. Lopez aware. Spoke with OBGYN resident to make aware of discharge, reports she will d/w Attg Bandremer and call back. Called back by OBGYN resident who d/w attending, pt cleared for d/c.

## 2023-12-29 NOTE — ED ADULT NURSE REASSESSMENT NOTE - NS ED NURSE REASSESS COMMENT FT1
Patient currently resting comfortably with no complaints or requests at this time. IV site looks clean & dry no signs of infiltration noted. Pt is advised to call for help  call bell with in the reach pt verbalized the understanding.  Comfort care & safety measures continued

## 2024-01-01 NOTE — ED PROVIDER NOTE - IV ALTEPLASE DOOR HIDDEN
Thank You!    It was a pleasure taking care of you in our Emergency Department today. We know that when you come to [facility], you are entrusting us with your health, comfort, and safety. Our clinicians honor that trust, and truly appreciate the opportunity to care for you and your loved ones.    If you receive a survey about your Emergency Department experience today, please fill it out.  We value your feedback. Thank you.      Kaylyn Harrison PA-C    ___________________________________  I have included a copy of your lab results and/or radiologic studies from today's visit so you can have them easily available at your follow-up visit.   Recent Results (from the past 12 hour(s))   EKG 12 Lead    Collection Time: 01/01/24  8:02 AM   Result Value Ref Range    Ventricular Rate 75 BPM    Atrial Rate 75 BPM    P-R Interval 140 ms    QRS Duration 106 ms    Q-T Interval 404 ms    QTc Calculation (Bazett) 451 ms    P Axis 33 degrees    R Axis 37 degrees    T Axis 36 degrees    Diagnosis       Normal sinus rhythm  Normal ECG  When compared with ECG of 04-OCT-2023 14:00,  No significant change was found         XR SHOULDER RIGHT (MIN 2 VIEWS)   Final Result   No acute abnormality.        [unfilled]           show

## 2024-01-03 ENCOUNTER — OUTPATIENT (OUTPATIENT)
Dept: OUTPATIENT SERVICES | Facility: HOSPITAL | Age: 29
LOS: 1 days | End: 2024-01-03
Payer: COMMERCIAL

## 2024-01-03 ENCOUNTER — APPOINTMENT (OUTPATIENT)
Dept: ULTRASOUND IMAGING | Facility: IMAGING CENTER | Age: 29
End: 2024-01-03
Payer: COMMERCIAL

## 2024-01-03 DIAGNOSIS — Z00.8 ENCOUNTER FOR OTHER GENERAL EXAMINATION: ICD-10-CM

## 2024-01-03 DIAGNOSIS — N85.01 BENIGN ENDOMETRIAL HYPERPLASIA: ICD-10-CM

## 2024-01-03 PROBLEM — Z78.9 OTHER SPECIFIED HEALTH STATUS: Chronic | Status: ACTIVE | Noted: 2023-12-28

## 2024-01-03 PROCEDURE — 93970 EXTREMITY STUDY: CPT

## 2024-01-03 PROCEDURE — 93970 EXTREMITY STUDY: CPT | Mod: 26

## 2024-01-09 NOTE — ASSESSMENT
[FreeTextEntry1] : well appearing femae, no acute distress, normal work of breathing, normal gait. reviewed labs from recent ED visit, no emergent findings. possible reaction to flu vaccine, lower concern for rhabdomyolysis but will order CK. low concern for GBS given symptoms confined to legs and no real neurological symptoms.

## 2024-01-09 NOTE — PLAN
[FreeTextEntry1] : will order CK draw to assess for rhabdomyolysis. patient to follouwp with her neurologist regarding MRI and EMG testing.

## 2024-01-09 NOTE — HISTORY OF PRESENT ILLNESS
[FreeTextEntry8] : 27F presenting with "strange feeling" in her legs three days after taking the flu vaccine. denies pain, numbness or tingling but states she feels as if her legs are 'giving out on her". went to [facility] ED when symptoms first began and had a normal workup. saw a neurologist who is scheduling an MRI and EMG. had a similar episode a year ago after being on steroids.

## 2024-03-01 ENCOUNTER — ASOB RESULT (OUTPATIENT)
Age: 29
End: 2024-03-01

## 2024-03-01 ENCOUNTER — APPOINTMENT (OUTPATIENT)
Dept: ANTEPARTUM | Facility: CLINIC | Age: 29
End: 2024-03-01
Payer: COMMERCIAL

## 2024-03-01 PROCEDURE — 76811 OB US DETAILED SNGL FETUS: CPT

## 2024-03-22 NOTE — ED CDU PROVIDER INITIAL DAY NOTE - PHYSICAL EXAMINATION
Gen: AAO x 3, NAD  Skin: No rashes or lesions  HEENT: NC/AT, PERRLA, EOMI, MMM  Resp: unlabored CTAB  Cardiac: rrr s1s2, no murmurs, rubs or gallops  GI: ND, +BS, Soft, NT  Ext: no pedal edema, FROM in all extremities  Neuro: no focal deficits
- - -

## 2024-07-09 ENCOUNTER — INPATIENT (INPATIENT)
Facility: HOSPITAL | Age: 29
LOS: 1 days | Discharge: HOME | DRG: 951 | End: 2024-07-11
Attending: OBSTETRICS & GYNECOLOGY | Admitting: OBSTETRICS & GYNECOLOGY
Payer: COMMERCIAL

## 2024-07-09 ENCOUNTER — TRANSCRIPTION ENCOUNTER (OUTPATIENT)
Age: 29
End: 2024-07-09

## 2024-07-09 VITALS — TEMPERATURE: 98 F

## 2024-07-09 DIAGNOSIS — O26.899 OTHER SPECIFIED PREGNANCY RELATED CONDITIONS, UNSPECIFIED TRIMESTER: ICD-10-CM

## 2024-07-09 DIAGNOSIS — Z98.890 OTHER SPECIFIED POSTPROCEDURAL STATES: Chronic | ICD-10-CM

## 2024-07-09 DIAGNOSIS — Z34.80 ENCOUNTER FOR SUPERVISION OF OTHER NORMAL PREGNANCY, UNSPECIFIED TRIMESTER: ICD-10-CM

## 2024-07-09 LAB
BASOPHILS # BLD AUTO: 0.04 K/UL — SIGNIFICANT CHANGE UP (ref 0–0.2)
BASOPHILS NFR BLD AUTO: 0.4 % — SIGNIFICANT CHANGE UP (ref 0–2)
BLD GP AB SCN SERPL QL: NEGATIVE — SIGNIFICANT CHANGE UP
EOSINOPHIL # BLD AUTO: 0.06 K/UL — SIGNIFICANT CHANGE UP (ref 0–0.5)
EOSINOPHIL NFR BLD AUTO: 0.5 % — SIGNIFICANT CHANGE UP (ref 0–6)
HCT VFR BLD CALC: 33.3 % — LOW (ref 34.5–45)
HGB BLD-MCNC: 11 G/DL — LOW (ref 11.5–15.5)
IMM GRANULOCYTES NFR BLD AUTO: 0.7 % — SIGNIFICANT CHANGE UP (ref 0–0.9)
LYMPHOCYTES # BLD AUTO: 28.9 % — SIGNIFICANT CHANGE UP (ref 13–44)
LYMPHOCYTES # BLD AUTO: 3.3 K/UL — SIGNIFICANT CHANGE UP (ref 1–3.3)
MCHC RBC-ENTMCNC: 30.1 PG — SIGNIFICANT CHANGE UP (ref 27–34)
MCHC RBC-ENTMCNC: 33 GM/DL — SIGNIFICANT CHANGE UP (ref 32–36)
MCV RBC AUTO: 91.2 FL — SIGNIFICANT CHANGE UP (ref 80–100)
MONOCYTES # BLD AUTO: 0.78 K/UL — SIGNIFICANT CHANGE UP (ref 0–0.9)
MONOCYTES NFR BLD AUTO: 6.8 % — SIGNIFICANT CHANGE UP (ref 2–14)
NEUTROPHILS # BLD AUTO: 7.16 K/UL — SIGNIFICANT CHANGE UP (ref 1.8–7.4)
NEUTROPHILS NFR BLD AUTO: 62.7 % — SIGNIFICANT CHANGE UP (ref 43–77)
NRBC # BLD: 0 /100 WBCS — SIGNIFICANT CHANGE UP (ref 0–0)
PLATELET # BLD AUTO: 160 K/UL — SIGNIFICANT CHANGE UP (ref 150–400)
RBC # BLD: 3.65 M/UL — LOW (ref 3.8–5.2)
RBC # FLD: 13.2 % — SIGNIFICANT CHANGE UP (ref 10.3–14.5)
RH IG SCN BLD-IMP: POSITIVE — SIGNIFICANT CHANGE UP
T PALLIDUM AB TITR SER: NEGATIVE — SIGNIFICANT CHANGE UP
WBC # BLD: 11.42 K/UL — HIGH (ref 3.8–10.5)
WBC # FLD AUTO: 11.42 K/UL — HIGH (ref 3.8–10.5)

## 2024-07-09 RX ORDER — DEXTROSE MONOHYDRATE AND SODIUM CHLORIDE 5; .3 G/100ML; G/100ML
1000 INJECTION, SOLUTION INTRAVENOUS
Refills: 0 | Status: DISCONTINUED | OUTPATIENT
Start: 2024-07-09 | End: 2024-07-10

## 2024-07-09 RX ORDER — OXYTOCIN 30 [USP'U]/500ML
333.33 INJECTION, SOLUTION INTRAVENOUS
Qty: 20 | Refills: 0 | Status: DISCONTINUED | OUTPATIENT
Start: 2024-07-09 | End: 2024-07-11

## 2024-07-09 RX ORDER — TRISODIUM CITRATE DIHYDRATE AND CITRIC ACID MONOHYDRATE 500; 334 MG/5ML; MG/5ML
15 SOLUTION ORAL EVERY 6 HOURS
Refills: 0 | Status: DISCONTINUED | OUTPATIENT
Start: 2024-07-09 | End: 2024-07-10

## 2024-07-09 RX ORDER — OXYTOCIN 30 [USP'U]/500ML
2 INJECTION, SOLUTION INTRAVENOUS
Qty: 30 | Refills: 0 | Status: DISCONTINUED | OUTPATIENT
Start: 2024-07-09 | End: 2024-07-11

## 2024-07-09 RX ADMIN — OXYTOCIN 2 MILLIUNIT(S)/MIN: 30 INJECTION, SOLUTION INTRAVENOUS at 07:49

## 2024-07-09 RX ADMIN — DEXTROSE MONOHYDRATE AND SODIUM CHLORIDE 125 MILLILITER(S): 5; .3 INJECTION, SOLUTION INTRAVENOUS at 14:20

## 2024-07-09 RX ADMIN — DEXTROSE MONOHYDRATE AND SODIUM CHLORIDE 125 MILLILITER(S): 5; .3 INJECTION, SOLUTION INTRAVENOUS at 05:48

## 2024-07-09 NOTE — OB PROVIDER H&P - ASSESSMENT
27yo  @38w2d admitted to L&D for PROM@2am.    A/P  - Admit to L&D  - Routine labs   - EFM/TOCO: Resuscitative measures PRN  - Clear liquid diet  - IVH  - Anesthesia consult   - Bicitra  - BCx1 dose then Pitocin for IOL  - Expect     dw Dr. Kevin Hackett PAC

## 2024-07-09 NOTE — OB PROVIDER H&P - NS ATTEND AMEND GEN_ALL_CORE FT
89 yo  at 38.3 admitted for IOL in the setting of PROM. GBS negative. Plan for 1 dose of buccal cytotec followed by pitocin. Fetal and maternal status reassuring.

## 2024-07-09 NOTE — OB PROVIDER H&P - NS_OBGYNHISTORY_OBGYN_ALL_OB_FT
OBHx: 2017 FT  #6lbs 5oz            Denies hx of pregnancy losses or terminations  GYNHx: B/L Fibrocystic Breasts. Hx of b/l benign cystectomy  and               Small fibroid x1 found in early pregnancy, not further followed due to small size  No ovarian cysts, hx of abnormal pap or

## 2024-07-09 NOTE — OB PROVIDER IHI INDUCTION/AUGMENTATION NOTE - LABOR: FETAL STATION
07/09/20 7051   Wound Leg lower Right # 1   Date First Assessed/Time First Assessed: 05/07/20 0835   Present on Hospital Admission: Yes  Wound Approximate Age at First Assessment (Weeks): 4 weeks  Primary Wound Type: Vascular  Location: Leg lower  Wound Location Orientation: Right  Wound Descri. ..    Dressing Status Breakthrough drainage   Dressing Type Xeroform;Gauze;Gauze wrap (ant);ABD pad   Wound Length (cm) 17.7 cm   Wound Width (cm) 9.4 cm   Wound Depth (cm) 1 cm   Wound Surface Area (cm^2) 166.38 cm^2   Wound Volume (cm^3) 166.38 cm^3   Change in Wound Size % 17.11   Condition of Base Eschar;Slough   Condition of Edges Open   Drainage Amount Large   Drainage Color Serosanguinous   Wound Odor None   Chana-wound Assessment Blanchable erythema;Edema   Cleansing and Cleansing Agents  Normal saline     Visit Vitals  /62   Pulse (!) 59   Temp 97.9 °F (36.6 °C)   Resp 16 -3

## 2024-07-09 NOTE — OB PROVIDER H&P - HISTORY OF PRESENT ILLNESS
29 yo  @38w2d presents reporting a leakage of fluid this morning at 2am. Patient reports she woke up to use the bathroom and then when laying down she felt leakage of clear fluid with pinkish flecks. Patient reports intermittent leakage since then. Patient also reports increasing contractions every 5-10mins 4/10 intensity. Patient reports she was last found to be closed in the office . Patient reports she is otherwise feeling well. Denies headache, visual changes, epigastric pain, nausea, vomiting. +FM, +LOF, +CTX, -VB.     PNC Uncomplicated  GBS Neg  EFW: 6lbs 2oz as per sono @36 weeks, extrapolated to 3000g    OBHx: 2017 FT  #6lbs 5oz            Denies hx of pregnancy losses or terminations  GYNHx: B/L Fibrocystic Breasts. Hx of b/l benign cystectomy  and               Small fibroid x1 found in early pregnancy, not further followed due to small size  No ovarian cysts, hx of abnormal pap or STDs  PMHx: Seizures of unknown origin in childhood. Patient no longer follows with neurology. Last seen in 2017 with first pregnancy. No hx of DM, HTN, asthma, bleeding or clotting disorders or blood transfusions.  PSurgHx: See GYN Hx  Allergies: NKDA  Meds: PNV q daily  Social: No smoking, alcohol, or illicit drug use during pregnancy   Psych: Anxiety not taking medication. Denies hx of depression

## 2024-07-09 NOTE — OB RN PATIENT PROFILE - FUNCTIONAL ASSESSMENT - BASIC MOBILITY 6.
4-calculated by average /Not able to assess (calculate score using [facility] averaging method)

## 2024-07-09 NOTE — OB PROVIDER H&P - NSHPPHYSICALEXAM_GEN_ALL_CORE
PE:  T(C): 36.8 (07-09-24 @ 04:21), Max: 36.8 (07-09-24 @ 03:33)  HR: 77 (07-09-24 @ 05:08) (69 - 84)  BP: 125/80 (07-09-24 @ 04:21) (125/80 - 125/80)  RR: 16 (07-09-24 @ 04:21) (16 - 16)  SpO2: 94% (07-09-24 @ 05:08) (89% - 100%)    General: NAD, A&Ox3  CV: RRR  Lungs: Clear bilat   Abd: soft, nontender, gravid  SSE: Nitrazine pos, pooling pos, ferning pos  VE: 0.5/0/-3  BSUS: Vertex  EFM: 130/moderate variablity/+accels/-decels  TOCO: q3-5mins

## 2024-07-09 NOTE — OB PROVIDER H&P - NSLOWPPHRISK_OBGYN_A_OB
No previous uterine incision/Boss Pregnancy/Less than or equal to 4 previous vaginal births/No known bleeding disorder/No history of postpartum hemorrhage/No other PPH risks indicated

## 2024-07-10 RX ORDER — OXYTOCIN 30 [USP'U]/500ML
41.67 INJECTION, SOLUTION INTRAVENOUS
Qty: 20 | Refills: 0 | Status: DISCONTINUED | OUTPATIENT
Start: 2024-07-10 | End: 2024-07-11

## 2024-07-10 RX ORDER — HYDROCORTISONE ACETATE 1 %
1 OINTMENT (GRAM) RECTAL EVERY 4 HOURS
Refills: 0 | Status: DISCONTINUED | OUTPATIENT
Start: 2024-07-10 | End: 2024-07-11

## 2024-07-10 RX ORDER — SODIUM CHLORIDE 0.9 % (FLUSH) 0.9 %
3 SYRINGE (ML) INJECTION EVERY 8 HOURS
Refills: 0 | Status: DISCONTINUED | OUTPATIENT
Start: 2024-07-10 | End: 2024-07-11

## 2024-07-10 RX ORDER — LANOLIN
1 WAX (GRAM) MISCELLANEOUS EVERY 6 HOURS
Refills: 0 | Status: DISCONTINUED | OUTPATIENT
Start: 2024-07-10 | End: 2024-07-11

## 2024-07-10 RX ORDER — DIBUCAINE 1 %
1 OINTMENT (GRAM) TOPICAL EVERY 6 HOURS
Refills: 0 | Status: DISCONTINUED | OUTPATIENT
Start: 2024-07-10 | End: 2024-07-11

## 2024-07-10 RX ORDER — PRENATAL VIT/IRON FUM/FOLIC AC 60 MG-1 MG
1 TABLET ORAL DAILY
Refills: 0 | Status: DISCONTINUED | OUTPATIENT
Start: 2024-07-10 | End: 2024-07-11

## 2024-07-10 RX ORDER — DIPHENHYDRAMINE HCL 12.5MG/5ML
25 ELIXIR ORAL EVERY 6 HOURS
Refills: 0 | Status: DISCONTINUED | OUTPATIENT
Start: 2024-07-10 | End: 2024-07-11

## 2024-07-10 RX ORDER — KETOROLAC TROMETHAMINE 30 MG/ML
30 INJECTION, SOLUTION INTRAMUSCULAR ONCE
Refills: 0 | Status: DISCONTINUED | OUTPATIENT
Start: 2024-07-10 | End: 2024-07-10

## 2024-07-10 RX ORDER — OXYCODONE HYDROCHLORIDE 100 MG/5ML
5 SOLUTION ORAL ONCE
Refills: 0 | Status: DISCONTINUED | OUTPATIENT
Start: 2024-07-10 | End: 2024-07-11

## 2024-07-10 RX ORDER — TETANUS TOXOID, REDUCED DIPHTHERIA TOXOID AND ACELLULAR PERTUSSIS VACCINE, ADSORBED 5; 2.5; 8; 8; 2.5 [IU]/.5ML; [IU]/.5ML; UG/.5ML; UG/.5ML; UG/.5ML
0.5 SUSPENSION INTRAMUSCULAR ONCE
Refills: 0 | Status: DISCONTINUED | OUTPATIENT
Start: 2024-07-10 | End: 2024-07-11

## 2024-07-10 RX ORDER — ACETAMINOPHEN 325 MG
975 TABLET ORAL
Refills: 0 | Status: DISCONTINUED | OUTPATIENT
Start: 2024-07-10 | End: 2024-07-11

## 2024-07-10 RX ORDER — OXYTOCIN 30 [USP'U]/500ML
10 INJECTION, SOLUTION INTRAVENOUS ONCE
Refills: 0 | Status: DISCONTINUED | OUTPATIENT
Start: 2024-07-10 | End: 2024-07-11

## 2024-07-10 RX ORDER — SIMETHICONE 40MG/0.6ML
80 SUSPENSION, DROPS(FINAL DOSAGE FORM)(ML) ORAL EVERY 4 HOURS
Refills: 0 | Status: DISCONTINUED | OUTPATIENT
Start: 2024-07-10 | End: 2024-07-11

## 2024-07-10 RX ORDER — PRAMOXINE HCL 1 %
1 CREAM (GRAM) RECTAL EVERY 4 HOURS
Refills: 0 | Status: DISCONTINUED | OUTPATIENT
Start: 2024-07-10 | End: 2024-07-11

## 2024-07-10 RX ORDER — BENZOCAINE 15 %
1 LIQUID (ML) TOPICAL EVERY 6 HOURS
Refills: 0 | Status: DISCONTINUED | OUTPATIENT
Start: 2024-07-10 | End: 2024-07-11

## 2024-07-10 RX ORDER — OXYCODONE HYDROCHLORIDE 100 MG/5ML
5 SOLUTION ORAL
Refills: 0 | Status: DISCONTINUED | OUTPATIENT
Start: 2024-07-10 | End: 2024-07-11

## 2024-07-10 RX ADMIN — Medication 975 MILLIGRAM(S): at 15:45

## 2024-07-10 RX ADMIN — Medication 975 MILLIGRAM(S): at 09:15

## 2024-07-10 RX ADMIN — Medication 600 MILLIGRAM(S): at 12:38

## 2024-07-10 RX ADMIN — Medication 975 MILLIGRAM(S): at 22:26

## 2024-07-10 RX ADMIN — Medication 600 MILLIGRAM(S): at 23:19

## 2024-07-10 RX ADMIN — KETOROLAC TROMETHAMINE 30 MILLIGRAM(S): 30 INJECTION, SOLUTION INTRAMUSCULAR at 03:34

## 2024-07-10 RX ADMIN — Medication 1 TABLET(S): at 12:39

## 2024-07-10 RX ADMIN — Medication 975 MILLIGRAM(S): at 21:46

## 2024-07-10 RX ADMIN — Medication 975 MILLIGRAM(S): at 09:45

## 2024-07-10 RX ADMIN — Medication 600 MILLIGRAM(S): at 13:08

## 2024-07-10 RX ADMIN — Medication 600 MILLIGRAM(S): at 18:16

## 2024-07-10 RX ADMIN — Medication 975 MILLIGRAM(S): at 15:15

## 2024-07-10 RX ADMIN — Medication 600 MILLIGRAM(S): at 18:46

## 2024-07-10 NOTE — DISCHARGE NOTE OB - MATERIALS PROVIDED
[facility] [place] Screening Program/Breastfeeding Log/Breastfeeding Mother’s Support Group Information/Guide to Postpartum Care/[facility] Hearing Screen Program/Back To Sleep Handout/Shaken Baby Prevention Handout/Breastfeeding Guide and Packet/Birth Certificate Instructions post birth warning signs/[place] Screening Program/Breastfeeding Log/Breastfeeding Mother’s Support Group Information/Guide to Postpartum Care/[facility] Hearing Screen Program/Back To Sleep Handout/Shaken Baby Prevention Handout/Breastfeeding Guide and Packet/Birth Certificate Instructions

## 2024-07-10 NOTE — OB PROVIDER DELIVERY SUMMARY - NSCOUNTCORRECT_OBGYN_ALL_OB
Laps, needles and instruments count was reported as correct.
Laps, needles and instruments count was reported as correct.

## 2024-07-10 NOTE — OB PROVIDER DELIVERY SUMMARY - NSSELHIDDEN_OBGYN_ALL_OB_FT
[NS_DeliveryAttending1_OBGYN_ALL_OB_FT:[id number]],[NS_DeliveryAssist1_OBGYN_ALL_OB_FT:[id number]]
[NS_DeliveryAttending1_OBGYN_ALL_OB_FT:[id number]],[NS_DeliveryAssist1_OBGYN_ALL_OB_FT:[id number]]

## 2024-07-10 NOTE — DISCHARGE NOTE OB - CARE PROVIDER_API CALL
Frances Meng  Obstetrics and Gynecology  [address], [address]  [place] [address]  Phone: [phone]  Fax: [phone]  Follow Up Time: Routine

## 2024-07-10 NOTE — OB PROVIDER LABOR PROGRESS NOTE - NS_SUBJECTIVE/OBJECTIVE_OBGYN_ALL_OB_FT
OB attending     Patient admitted with PROM, STARTED ON PITOCIN AT 7:45a  Requesting epidural     Vital Signs Last 24 Hrs  T(C): 36.8 (09 Jul 2024 14:50), Max: 36.9 (09 Jul 2024 11:00)  T(F): 98.24 (09 Jul 2024 14:50), Max: 98.42 (09 Jul 2024 11:00)  HR: 75 (09 Jul 2024 16:18) (64 - 185)  BP: 123/84 (09 Jul 2024 16:04) (101/57 - 164/75)  BP(mean): --  RR: 18 (09 Jul 2024 14:50) (16 - 18)  SpO2: 100% (09 Jul 2024 16:18) (76% - 100%)    Parameters below as of 09 Jul 2024 04:21  Patient On (Oxygen Delivery Method): room air  NAD   Exam below     EFW 3200g    LABS                        11.0   11.42 )-----------( 160      ( 09 Jul 2024 05:53 )             33.3   o+/AB NEG   GBS Neg
OB attending     Patient examined due to variables    Vital Signs Last 24 Hrs  T(C): 37.0 (09 Jul 2024 20:51), Max: 37.1 (09 Jul 2024 17:10)  T(F): 98.6 (09 Jul 2024 20:51), Max: 98.78 (09 Jul 2024 17:10)  HR: 81 (09 Jul 2024 22:38) (64 - 185)  BP: 129/60 (09 Jul 2024 22:38) (101/57 - 164/75)  BP(mean): --  RR: 18 (09 Jul 2024 20:51) (16 - 18)  SpO2: 82% (09 Jul 2024 22:36) (76% - 100%)    Parameters below as of 09 Jul 2024 04:21  Patient On (Oxygen Delivery Method): room air    NAD
VE due to deceleration

## 2024-07-10 NOTE — OB PROVIDER DELIVERY SUMMARY - NSLOWPPHRISK_OBGYN_A_OB
No previous uterine incision/Boss Pregnancy/Less than or equal to 4 previous vaginal births/No known bleeding disorder/No history of postpartum hemorrhage/No other PPH risks indicated
No previous uterine incision/Boss Pregnancy/Less than or equal to 4 previous vaginal births/No known bleeding disorder/No history of postpartum hemorrhage/No other PPH risks indicated

## 2024-07-10 NOTE — OB RN DELIVERY SUMMARY - NSSELHIDDEN_OBGYN_ALL_OB_FT
[NS_DeliveryAttending1_OBGYN_ALL_OB_FT:[id number]],[NS_DeliveryAssist1_OBGYN_ALL_OB_FT:[id number]],[NS_DeliveryRN_OBGYN_ALL_OB_FT:[id number]]

## 2024-07-10 NOTE — OB RN DELIVERY SUMMARY - NS_NEWBORNACONDIT_OBGYN_ALL_OB
Initial Psychiatric History and Physical    Tone Jett  [id number]  1/16/2023 01/19/23    ID: Patient is a 78 yrs y.o. male    CC:I am in an assisted living. HPI: Patient is a 78year old male with a pmh of HFrecoveredEF s/p BiV PPM, paroxysmal atrial fibrillation, hypertension, dementia, CKD, hyperlipidemia, anxiety/depression, who presents with Failure to thrive in adult. Patient initially presented to Psychiatric ED via EMS s/p unwitnessed fall which family found as he was getting up. Daughter cares for patient. He tends to get agitated in the evening, becoming \"hateful towards daughter and curses. \" Daughter to bring in 3 [facility] and 1 [facility]. Patient has pacemaker. Found to have NSTEMI. For depression on sertraline and bupropion. Consults include cardiology. Psychiatry consulted by Dr Nicole Rodriguez due to  \" new onset behaviors in setting of dementia. \"      1/16/23 EKG- qtc 507, patient has pacemaker    Met with patient at bedside. Patient is alert and oriented to name only. With prompting he was able to stay it is January 1, 2021 and he is in Assisted Living. Patient was updated he is in the hospital was not surprised but does not know why he is here. He does remember he was brought here by a squad after a fall somewhat into the conversation. He met most questions with humour and it appears he is using that to cover his confusion at what is going on. Per Laura Hughes, his nurse, the [facility] has not gone off today. He was able to eat his breakfast on his own today Information from night shift is limited but she does not believe he is sleeping at night and does get more agitated. Night shift nurse stated Talya Galeano is sundowning\" but was unable to explain specific behaviors. Requested night shift please document what behaviors for more specific and appropriate treatment.     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 Patient on zoloft and Wellbutrin XL. Family Psychiatric History:   Family History   Problem Relation Age of Onset    Stroke Mother         CVA    Heart Disease Mother     Prostate Cancer Brother     Pacemaker Brother     Cancer Brother         skin    Cancer Daughter         colon    Substance Abuse Son         Tobacco    No Known Problems Father         Allergies:   Allergies   Allergen Reactions    Codeine Anaphylaxis    Fentanyl Other (See Comments)     Hypotension         OBJECTIVE  Vital Signs:  Vitals:    01/19/23 1132   BP:    Pulse:    Resp:    Temp:    SpO2: 96%       Labs:  Recent Results (from the past 48 hour(s))   Basic Metabolic Panel    Collection Time: 01/18/23  5:17 AM   Result Value Ref Range    Sodium 139 135 - 145 MMOL/L    Potassium 3.7 3.5 - 5.1 MMOL/L    Chloride 103 99 - 110 mMol/L    CO2 24 21 - 32 MMOL/L    Anion Gap 12 4 - 16    BUN 31 (H) 6 - 23 MG/DL    Creatinine 1.5 (H) 0.9 - 1.3 MG/DL    Est, Glom Filt Rate 43 (L) >60 mL/min/1.73m2    Glucose 92 70 - 99 MG/DL    Calcium 8.6 8.3 - 10.6 MG/DL   Magnesium    Collection Time: 01/18/23  5:17 AM   Result Value Ref Range    Magnesium 1.8 1.8 - 2.4 mg/dl   Phosphorus    Collection Time: 01/18/23  5:17 AM   Result Value Ref Range    Phosphorus 3.2 2.5 - 4.9 MG/DL   CBC with Auto Differential    Collection Time: 01/18/23  5:17 AM   Result Value Ref Range    WBC 7.0 4.0 - 10.5 K/CU MM    RBC 3.66 (L) 4.6 - 6.2 M/CU MM    Hemoglobin 11.8 (L) 13.5 - 18.0 GM/DL    Hematocrit 36.4 (L) 42 - 52 %    MCV 99.5 78 - 100 FL    MCH 32.2 (H) 27 - 31 PG    MCHC 32.4 32.0 - 36.0 %    RDW 13.6 11.7 - 14.9 %    Platelets 345 471 - 305 K/CU MM    MPV 10.2 7.5 - 11.1 FL    Differential Type AUTOMATED DIFFERENTIAL     Segs Relative 74.5 (H) 36 - 66 %    Lymphocytes % 14.4 (L) 24 - 44 %    Monocytes % 8.3 (H) 0 - 4 % Eosinophils % 2.1 0 - 3 %    Basophils % 0.4 0 - 1 %    Segs Absolute 5.2 K/CU MM    Lymphocytes Absolute 1.0 K/CU MM    Monocytes Absolute 0.6 K/CU MM    Eosinophils Absolute 0.2 K/CU MM    Basophils Absolute 0.0 K/CU MM    Nucleated RBC % 0.0 %    Total Nucleated RBC 0.0 K/CU MM    Total Immature Neutrophil 0.02 K/CU MM    Immature Neutrophil % 0.3 0 - 0.43 %       Review of Systems:  Reports of no current cardiovascular, respiratory, gastrointestinal, genitourinary, integumentary, neurological, muscuoskeletal, or immunological symptoms today. PSYCHIATRIC: See HPI above. PSYCHIATRIC EXAMINATION / MENTAL STATUS EXAM    CONSTITUTIONAL:    Vitals:   Vitals:    01/19/23 1132   BP:    Pulse:    Resp:    Temp:    SpO2: 96%      General appearance: [] appears age, [x]  appears younger than stated age,               [x]  adequately dressed and groomed, [] disheveled,               [x]  in no acute distress, [] appears mildly distressed, [] other           MUSCULOSKELETAL:   Gait:   [] normal, [] antalgic, [] unsteady, [] gait not evaluated   Station:             [] erect, [] sitting, [x] recumbent, [] other        Strength/tone:  [x] muscle strength and tone appear consistent with age and                                        condition     [] atrophy      [] abnormal movements     Vitals: Blood pressure 111/71, pulse 79, temperature 97.7 °F (36.5 °C), temperature source Axillary, resp. rate 17, height 5' 7.99\" (1.727 m), weight 139 lb 8.8 oz (63.3 kg), SpO2 96 %. CONSTITUTIONAL:    Appearance: appears stated age. alert and oriented to person, disoriented to place, time & situation. no acute distress. Adequate grooming and hygeine. Good eye contact. No prominent physical abnormalities. Attitude:  Manner is cooperative and pleasant but confused   Motor: Noted psychomotor agitation restless and always moving, no retardation or abnormal movements noted  Speech: Clearly articulated; normal rate, volume, tone & amount. but does not answer direct questions easily  Language: I?ntact understanding and production  Mood: \"good\"  Affect: euthymic, full range, non-labile, congruent with mood and content of speech  Thought Production: Spontaneous. repeats questions back  Thought Form: ?Coherent, linear, logical &  not goal-directed. No tangentiality or circumstantiality. No flight of ideas or loosening of associations. Thought Content/Perceptions: No JAYSHREE, no AVH, no delusion  Insight:poor  Judgment questionable  Memory: Immediate, recent, and remote appear impaired, though not formally tested. 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 Recommend starting depakote sprinkles 125 mg po at dinner time  Recommend starting depakote er 250 mg po at bedtime for mood, depression and agitation.   Valproate acid level for   If patient is not sleeping will leave trazodone 25 mg po prn at bedtime  Recommend labs: ammonia, vit b 12 and folate, vit d  Psychiatry will follow  Thank you for this consult  PS to Dr Radha Fenton regarding recommendations    Electronically signed by LIA Horne CNP on 1/19/2023 at 1:15 PM [facility]

## 2024-07-10 NOTE — OB RN DELIVERY SUMMARY - NS_FINALEDD_OBGYN_ALL_OB_DT
20-Jul-2024 Topical Sulfur Applications Pregnancy And Lactation Text: This medication is Pregnancy Category C and has an unknown safety profile during pregnancy. It is unknown if this topical medication is excreted in breast milk.

## 2024-07-10 NOTE — DISCHARGE NOTE OB - MEDICATION SUMMARY - MEDICATIONS TO TAKE
I will START or STAY ON the medications listed below when I get home from the hospital:     mg oral tablet  -- 1 tab(s) by mouth every 6 hours   -- Do not take this drug if you are pregnant.  It is very important that you take or use this exactly as directed.  Do not skip doses or discontinue unless directed by your doctor.  May cause drowsiness or dizziness.  Obtain medical advice before taking any non-prescription drugs as some may affect the action of this medication.  Take with food or milk.    -- Indication: For pain    acetaminophen 325 mg oral tablet  -- 3 tab(s) by mouth 3 times a day  -- Indication: For pain    Prenatal Multivitamins with Folic Acid 1 mg oral tablet  -- 1 tab(s) by mouth once a day  -- Indication: For wellness    cyclobenzaprine 10 mg oral tablet  -- 1 tab(s) by mouth every 8 hours   -- May cause drowsiness.  Alcohol may intensify this effect.  Use care when operating dangerous machinery.  Obtain medical advice before taking any non-prescription drugs as some may affect the action of this medication.    -- Indication: For prn muscle spasm

## 2024-07-10 NOTE — OB PROVIDER DELIVERY SUMMARY - NSPROVIDERDELIVERYNOTE_OBGYN_ALL_OB_FT
Spontaneous vaginal delivery of liveborn infant from OA position. Head, shoulders, and body delivered easily. Infant passed to mother. Cord was clamped and cut after delayed cord clamping. Placenta delivered spontaneously, noted to be intact. Fundal massage was given and uterine fundus was found to be firm. Vaginal exam revealed intact cervix, sulci, vaginal walls, perineum. Excellent hemostasis was noted. Patient stable. Count correct x 2.    Ayleen Adan, PGY-1
Spontaneous vaginal delivery of liveborn infant from OA position. Head, shoulders, and body delivered easily. Infant passed to mother. Cord was clamped and cut after delayed cord clamping. Placenta delivered spontaneously, noted to be intact. Fundal massage was given, IM pitocin was administered. Uterine fundus then found to be firm. Vaginal exam revealed intact cervix, sulci, vaginal walls. Perineum with second degree laceration, repaired in standard fashion with vicryl suture. Excellent hemostasis was noted. Patient stable. Count correct x 2.    Ayleen Adan, PGY-1

## 2024-07-10 NOTE — DISCHARGE NOTE OB - PATIENT PORTAL LINK FT
You can access the FollowMyHealth Patient Portal offered by [facility] by registering at the following website: [email or web address]. By joining [email or web address]’s FollowMyHealth portal, you will also be able to view your health information using other applications (apps) compatible with our system.

## 2024-07-10 NOTE — OB RN DELIVERY SUMMARY - NS_NEWBORNRN2_OBGYN_ALL_OB_FT
Nutrition Follow Up: 
Assessment:  
Diet: DIET PUREED 
DIET NUTRITIONAL SUPPLEMENTS All Meals; Magic Cups Food/Nutrition Patient History:  The patient is overall eating poorly. Family was assisting patient with some breakfast food during RD rounds this morning. She was taking a few bites here and there but overall did not seem interested in foods. She did not respond to any RD questions however family assisted with questions. Hospice care has been recommended by the family has declined. Anthropometrics:Height: 5' (152.4 cm),  Weight: 48.6 kg (107 lb 2.3 oz), Weight Source: Lift. Macronutrient needs: EER:  1137-0467 kcal /day (30-35 kcal/kg listed BW of 47.3 kg) EPR:  47-59 grams protein/day (1-1.25 grams/kg listed BW) Intake/Comparative Standards: Average intake for past 6 days/15 recorded meal(s): 33%. This potentially meets ~43% of kcal and ~50% of protein needs. Nutrition Diagnosis: Inadequate oral intake related to confusion and advanced age as evidenced by patient with dementia and lethargy along with inability to meet nutrition needs. Intervention: 
Meals and snacks: Continue current diet. Nutrition Supplement Therapy: Change to Magic Cup tid Nutrition Discharge Plan: Too soon to be determined. Randal Webb [address], [address],  [phone] [place]

## 2024-07-10 NOTE — OB PROVIDER LABOR PROGRESS NOTE - NS_OBIHIFHRDETAILS_OBGYN_ALL_OB_FT
120, moderate, +accels, no decels  pt sat up to vomit and lost contact with FHT. when contact re-established HR noted to be in the 90s.

## 2024-07-10 NOTE — PROGRESS NOTE ADULT - SUBJECTIVE AND OBJECTIVE BOX
PPD#0- ATTENDING NOTE    S: Patient doing well. Minimal lochia. Pain controlled.    O: Vital Signs Last 24 Hrs  T(C): 36.7 (10 Jul 2024 09:04), Max: 37.1 (2024 17:10)  T(F): 98.1 (10 Jul 2024 09:04), Max: 98.8 (10 Jul 2024 02:05)  HR: 70 (10 Jul 2024 09:04) (64 - 125)  BP: 123/82 (10 Jul 2024 09:04) (101/57 - 196/108)  BP(mean): 86 (10 Jul 2024 04:55) (86 - 92)  RR: 18 (10 Jul 2024 09:04) (16 - 18)  SpO2: 100% (10 Jul 2024 09:04) (79% - 100%)    Parameters below as of 10 Jul 2024 09:04  Patient On (Oxygen Delivery Method): room air        Gen: NAD  Abd: soft, NT, ND, fundus firm below umbilicus -2  Lochia: moderate  Ext: no tenderness, no hyper reflexia  Voiding well    Labs:  ABO Interpretation: O (24 @ 05:20)  Rh Interpretation: Positive (24 @ 05:20)  ABO Interpretation: O (24 @ 05:20)  Rh Interpretation: Positive (24 @ 05:20)   Antibody Screen                       11.0   11.42 )-----------( 160      ( 2024 05:53 )             33.3       A: 28y PPD# s/p  doing well.    Plan:  Analgesia prn  Regular diet  Follow up am labs  Routine post partum care    Office [phone]  Dr. Hernandez

## 2024-07-10 NOTE — OB PROVIDER LABOR PROGRESS NOTE - ASSESSMENT
A/P: 27 yo  at 38w3d admitted for IOL due to PROM, pit started at 07:45am, in latent labor  -continue pitocin   -gbs neg  -cat 1 tracing  -for epidural   -consent signed, male for circ    Frances Meng MD 
A/P: 29 yo  at 38w3d admitted with PROm, now in active stage  -decrease pitocin   -peanut ball/high brody's positioning  -gbs neg  -epidural in place    Frances Meng MD 
- pitocin paused  - FHT improved with maternal repositioning  - ISE placed due to difficulty obtaining external FHT amidst repositioning patient and due to patient's body habitus   - pt comfortable with epidural     Monika Shell, PGY4  d/w Dr. Meng

## 2024-07-10 NOTE — DISCHARGE NOTE OB - HOSPITAL COURSE
Patient admitted with PROM and had an uncomplicated vaginal delivery.  Patient had an unremarkable postpartum course and was stable for discharge home on postpartum day 1.

## 2024-07-10 NOTE — DISCHARGE NOTE OB - MEDICATION SUMMARY - MEDICATIONS TO STOP TAKING
I will STOP taking the medications listed below when I get home from the hospital:    cephalexin 500 mg oral tablet  -- 1 tab(s) by mouth every 6 hours    Pepcid 20 mg oral tablet  -- 1 tab(s) by mouth once a day  -- It is very important that you take or use this exactly as directed.  Do not skip doses or discontinue unless directed by your doctor.  Obtain medical advice before taking any non-prescription drugs as some may affect the action of this medication.    Benadryl 25 mg oral capsule  -- 1 cap(s) by mouth every 6 hours - for itching  -- May cause drowsiness.  Alcohol may intensify this effect.  Use care when operating dangerous machinery.  Obtain medical advice before taking any non-prescription drugs as some may affect the action of this medication.    Reglan 10 mg oral tablet  -- 1 tab(s) by mouth every 6 hours, As Needed for headache and nausea   -- It is very important that you take or use this exactly as directed.  Do not skip doses or discontinue unless directed by your doctor.  May cause drowsiness.  Alcohol may intensify this effect.  Use care when operating dangerous machinery.  Take medication on an empty stomach 1 hour before or 2 to 3 hours after a meal unless otherwise directed by your doctor.    Zofran ODT 4 mg oral tablet, disintegrating  -- 1 tab(s) by mouth every 6 hours, As Needed for nausea/vomiting    fluticasone 27.5 mcg/inh nasal spray  -- 1 spray(s) into nose 2 times a day.    Dispense 1 bottle please  -- For the nose.  Shake well before use.

## 2024-07-10 NOTE — OB RN DELIVERY SUMMARY - NS_SEPSISRSKCALC_OBGYN_ALL_OB_FT
EOS calculated successfully. EOS Risk Factor: 0.5/1000 live births ([facility] national incidence); GA=38w4d; Temp=98.78; ROM=24.883; GBS='Negative'; Antibiotics='No antibiotics or any antibiotics < 2 hrs prior to birth'

## 2024-07-11 VITALS
HEART RATE: 68 BPM | OXYGEN SATURATION: 98 % | DIASTOLIC BLOOD PRESSURE: 79 MMHG | RESPIRATION RATE: 18 BRPM | SYSTOLIC BLOOD PRESSURE: 125 MMHG | TEMPERATURE: 98 F

## 2024-07-11 PROCEDURE — 86780 TREPONEMA PALLIDUM: CPT

## 2024-07-11 PROCEDURE — 86901 BLOOD TYPING SEROLOGIC RH(D): CPT

## 2024-07-11 PROCEDURE — 59050 FETAL MONITOR W/REPORT: CPT

## 2024-07-11 PROCEDURE — 86900 BLOOD TYPING SEROLOGIC ABO: CPT

## 2024-07-11 PROCEDURE — 86850 RBC ANTIBODY SCREEN: CPT

## 2024-07-11 PROCEDURE — 85025 COMPLETE CBC W/AUTO DIFF WBC: CPT

## 2024-07-11 RX ORDER — ACETAMINOPHEN 325 MG
3 TABLET ORAL
Qty: 0 | Refills: 0 | DISCHARGE
Start: 2024-07-11

## 2024-07-11 RX ORDER — PRENATAL VIT/IRON FUM/FOLIC AC 60 MG-1 MG
1 TABLET ORAL
Qty: 0 | Refills: 0 | DISCHARGE
Start: 2024-07-11

## 2024-07-11 RX ADMIN — Medication 975 MILLIGRAM(S): at 10:23

## 2024-07-11 RX ADMIN — Medication 975 MILLIGRAM(S): at 09:23

## 2024-07-11 RX ADMIN — Medication 600 MILLIGRAM(S): at 13:06

## 2024-07-11 RX ADMIN — Medication 975 MILLIGRAM(S): at 03:15

## 2024-07-11 RX ADMIN — Medication 600 MILLIGRAM(S): at 05:43

## 2024-07-11 RX ADMIN — Medication 600 MILLIGRAM(S): at 00:13

## 2024-07-11 RX ADMIN — Medication 975 MILLIGRAM(S): at 04:34

## 2024-07-11 RX ADMIN — Medication 600 MILLIGRAM(S): at 12:06

## 2024-07-11 RX ADMIN — Medication 1 TABLET(S): at 12:06

## 2024-07-11 NOTE — PROGRESS NOTE ADULT - PROBLEM SELECTOR PLAN 1
A: 28y PPD# 1 s/p  doing well.    Plan:  Analgesia prn  Regular diet  Discharge instruction given  F/U 6 weeks      Office [phone]  Dr. Dillon

## 2024-07-11 NOTE — PROGRESS NOTE ADULT - SUBJECTIVE AND OBJECTIVE BOX
PPD#1- ATTENDING NOTE    S: Patient doing well. Minimal lochia. Pain controlled.    O: Vital Signs Last 24 Hrs  T(C): 36.7 (11 Jul 2024 09:06), Max: 36.9 (10 Jul 2024 16:45)  T(F): 98.1 (11 Jul 2024 09:06), Max: 98.4 (10 Jul 2024 16:45)  HR: 68 (11 Jul 2024 09:06) (68 - 79)  BP: 125/79 (11 Jul 2024 09:06) (105/71 - 125/79)  BP(mean): --  RR: 18 (11 Jul 2024 09:06) (18 - 18)  SpO2: 98% (11 Jul 2024 09:06) (98% - 100%)    Parameters below as of 11 Jul 2024 09:06  Patient On (Oxygen Delivery Method): room air        Gen: NAD  Abd: soft, NT, ND, fundus firm below umbilicus  Lochia: moderate  Ext: no tenderness, no hyper reflexia    Labs:

## 2024-07-15 ENCOUNTER — APPOINTMENT (OUTPATIENT)
Age: 29
End: 2024-07-15
Payer: COMMERCIAL

## 2024-07-15 PROCEDURE — S9443: CPT | Mod: 95

## 2024-07-15 NOTE — ED PROVIDER NOTE - IV ALTEPLASE DOOR HIDDEN
Charmaine from [facility] is calling to inform provider that they are certifying patient for 8 more weeks of nursing care.        show

## 2024-07-18 ENCOUNTER — APPOINTMENT (OUTPATIENT)
Age: 29
End: 2024-07-18
Payer: COMMERCIAL

## 2024-07-18 PROCEDURE — S9443: CPT | Mod: 95

## 2024-08-15 ENCOUNTER — EMERGENCY (EMERGENCY)
Facility: HOSPITAL | Age: 29
LOS: 1 days | Discharge: HOME | End: 2024-08-15
Attending: EMERGENCY MEDICINE
Payer: COMMERCIAL

## 2024-08-15 VITALS
HEART RATE: 61 BPM | DIASTOLIC BLOOD PRESSURE: 85 MMHG | WEIGHT: 154.98 LBS | OXYGEN SATURATION: 99 % | SYSTOLIC BLOOD PRESSURE: 121 MMHG | TEMPERATURE: 98 F | RESPIRATION RATE: 18 BRPM | HEIGHT: 61 IN

## 2024-08-15 DIAGNOSIS — Z98.890 OTHER SPECIFIED POSTPROCEDURAL STATES: Chronic | ICD-10-CM

## 2024-08-15 NOTE — ED ADULT NURSE NOTE - OBJECTIVE STATEMENT
29 y/o female presents to the ED endorsing L lower back pain radiating to the LE x1 week. A/Ox4. Ambulatory without assistive devices at baseline. PMH: Denies. Patient is . Patient endorses recent vaginal delivery on 7/10/24 with no complications. Patient endorses receiving an epidural during the delivery. Patient endorses onset of nausea with vomiting x2 days which she thinks is related to the pain. Patient denies any falls or recent traumas. Patient denies bowel/bladder incontinence. Patient denies nausea, vomiting, HA, blurry vision, chest pain and dyspnea. Safety and comfort provided.

## 2024-08-15 NOTE — ED ADULT TRIAGE NOTE - CHIEF COMPLAINT QUOTE
s/p vaginal delivery with epidural July 10 since last week  c/o left sided lower back pain with nausea and vomiting, diarrhea and  not eating for 2 days

## 2024-08-15 NOTE — ED ADULT NURSE NOTE - MODE OF DISCHARGE
Ambulatory Qbrexza Pregnancy And Lactation Text: There is no available data on Qbrexza use in pregnant women.  There is no available data on Qbrexza use in lactation.

## 2024-08-16 VITALS
TEMPERATURE: 99 F | HEART RATE: 75 BPM | DIASTOLIC BLOOD PRESSURE: 76 MMHG | OXYGEN SATURATION: 100 % | SYSTOLIC BLOOD PRESSURE: 112 MMHG | RESPIRATION RATE: 16 BRPM

## 2024-08-16 LAB
ALBUMIN SERPL ELPH-MCNC: 4.3 G/DL — SIGNIFICANT CHANGE UP (ref 3.3–5)
ALP SERPL-CCNC: 83 U/L — SIGNIFICANT CHANGE UP (ref 40–120)
ALT FLD-CCNC: 19 U/L — SIGNIFICANT CHANGE UP (ref 10–45)
ANION GAP SERPL CALC-SCNC: 14 MMOL/L — SIGNIFICANT CHANGE UP (ref 5–17)
AST SERPL-CCNC: 17 U/L — SIGNIFICANT CHANGE UP (ref 10–40)
BASOPHILS # BLD AUTO: 0.04 K/UL — SIGNIFICANT CHANGE UP (ref 0–0.2)
BASOPHILS NFR BLD AUTO: 0.4 % — SIGNIFICANT CHANGE UP (ref 0–2)
BILIRUB SERPL-MCNC: 0.2 MG/DL — SIGNIFICANT CHANGE UP (ref 0.2–1.2)
BUN SERPL-MCNC: 20 MG/DL — SIGNIFICANT CHANGE UP (ref 7–23)
CALCIUM SERPL-MCNC: 10 MG/DL — SIGNIFICANT CHANGE UP (ref 8.4–10.5)
CHLORIDE SERPL-SCNC: 102 MMOL/L — SIGNIFICANT CHANGE UP (ref 96–108)
CO2 SERPL-SCNC: 22 MMOL/L — SIGNIFICANT CHANGE UP (ref 22–31)
CREAT SERPL-MCNC: 0.77 MG/DL — SIGNIFICANT CHANGE UP (ref 0.5–1.3)
EGFR: 106 ML/MIN/1.73M2 — SIGNIFICANT CHANGE UP
EOSINOPHIL # BLD AUTO: 0.17 K/UL — SIGNIFICANT CHANGE UP (ref 0–0.5)
EOSINOPHIL NFR BLD AUTO: 1.5 % — SIGNIFICANT CHANGE UP (ref 0–6)
GLUCOSE SERPL-MCNC: 103 MG/DL — HIGH (ref 70–99)
HCG SERPL-ACNC: <2 MIU/ML — SIGNIFICANT CHANGE UP
HCT VFR BLD CALC: 40.2 % — SIGNIFICANT CHANGE UP (ref 34.5–45)
HGB BLD-MCNC: 13.1 G/DL — SIGNIFICANT CHANGE UP (ref 11.5–15.5)
IMM GRANULOCYTES NFR BLD AUTO: 0.4 % — SIGNIFICANT CHANGE UP (ref 0–0.9)
LYMPHOCYTES # BLD AUTO: 3.79 K/UL — HIGH (ref 1–3.3)
LYMPHOCYTES # BLD AUTO: 34.2 % — SIGNIFICANT CHANGE UP (ref 13–44)
MCHC RBC-ENTMCNC: 30 PG — SIGNIFICANT CHANGE UP (ref 27–34)
MCHC RBC-ENTMCNC: 32.6 GM/DL — SIGNIFICANT CHANGE UP (ref 32–36)
MCV RBC AUTO: 92.2 FL — SIGNIFICANT CHANGE UP (ref 80–100)
MONOCYTES # BLD AUTO: 0.65 K/UL — SIGNIFICANT CHANGE UP (ref 0–0.9)
MONOCYTES NFR BLD AUTO: 5.9 % — SIGNIFICANT CHANGE UP (ref 2–14)
NEUTROPHILS # BLD AUTO: 6.38 K/UL — SIGNIFICANT CHANGE UP (ref 1.8–7.4)
NEUTROPHILS NFR BLD AUTO: 57.6 % — SIGNIFICANT CHANGE UP (ref 43–77)
NRBC # BLD: 0 /100 WBCS — SIGNIFICANT CHANGE UP (ref 0–0)
PLATELET # BLD AUTO: 249 K/UL — SIGNIFICANT CHANGE UP (ref 150–400)
POTASSIUM SERPL-MCNC: 3.9 MMOL/L — SIGNIFICANT CHANGE UP (ref 3.5–5.3)
POTASSIUM SERPL-SCNC: 3.9 MMOL/L — SIGNIFICANT CHANGE UP (ref 3.5–5.3)
PROT SERPL-MCNC: 8.3 G/DL — SIGNIFICANT CHANGE UP (ref 6–8.3)
RBC # BLD: 4.36 M/UL — SIGNIFICANT CHANGE UP (ref 3.8–5.2)
RBC # FLD: 12.3 % — SIGNIFICANT CHANGE UP (ref 10.3–14.5)
SODIUM SERPL-SCNC: 138 MMOL/L — SIGNIFICANT CHANGE UP (ref 135–145)
WBC # BLD: 11.07 K/UL — HIGH (ref 3.8–10.5)
WBC # FLD AUTO: 11.07 K/UL — HIGH (ref 3.8–10.5)

## 2024-08-16 PROCEDURE — 84702 CHORIONIC GONADOTROPIN TEST: CPT

## 2024-08-16 PROCEDURE — 85025 COMPLETE CBC W/AUTO DIFF WBC: CPT

## 2024-08-16 PROCEDURE — 99284 EMERGENCY DEPT VISIT MOD MDM: CPT | Mod: 25

## 2024-08-16 PROCEDURE — 72158 MRI LUMBAR SPINE W/O & W/DYE: CPT | Mod: 26,MC

## 2024-08-16 PROCEDURE — 96374 THER/PROPH/DIAG INJ IV PUSH: CPT | Mod: XU

## 2024-08-16 PROCEDURE — A9585: CPT

## 2024-08-16 PROCEDURE — 72157 MRI CHEST SPINE W/O & W/DYE: CPT | Mod: 26,MC

## 2024-08-16 PROCEDURE — 80053 COMPREHEN METABOLIC PANEL: CPT

## 2024-08-16 PROCEDURE — 72158 MRI LUMBAR SPINE W/O & W/DYE: CPT | Mod: MC

## 2024-08-16 PROCEDURE — 72157 MRI CHEST SPINE W/O & W/DYE: CPT | Mod: MC

## 2024-08-16 PROCEDURE — 99236 HOSP IP/OBS SAME DATE HI 85: CPT

## 2024-08-16 RX ORDER — ACETAMINOPHEN 500 MG
1000 TABLET ORAL ONCE
Refills: 0 | Status: COMPLETED | OUTPATIENT
Start: 2024-08-16 | End: 2024-08-16

## 2024-08-16 RX ORDER — LIDOCAINE 5% 5 G/100G
1 CREAM TOPICAL ONCE
Refills: 0 | Status: COMPLETED | OUTPATIENT
Start: 2024-08-16 | End: 2024-08-16

## 2024-08-16 RX ORDER — KETOROLAC TROMETHAMINE 10 MG
15 TABLET ORAL ONCE
Refills: 0 | Status: DISCONTINUED | OUTPATIENT
Start: 2024-08-16 | End: 2024-08-16

## 2024-08-16 RX ORDER — BACTERIOSTATIC SODIUM CHLORIDE 0.9 %
1000 VIAL (ML) INJECTION ONCE
Refills: 0 | Status: COMPLETED | OUTPATIENT
Start: 2024-08-16 | End: 2024-08-16

## 2024-08-16 RX ADMIN — Medication 1000 MILLILITER(S): at 00:42

## 2024-08-16 RX ADMIN — Medication 400 MILLIGRAM(S): at 00:41

## 2024-08-16 RX ADMIN — LIDOCAINE 5% 1 PATCH: 5 CREAM TOPICAL at 01:40

## 2024-08-16 NOTE — CONSULT NOTE ADULT - ATTENDING COMMENTS
DOS 8/16 seen in ED  Briefly   ASSESSMENT   27 yo F with  migraines presented to the ED with symptoms of lower back pain radiating down the left buttock. Patient reports that she had an Epidural for a vaginal delivery 5 weeks ago. Pain in the site of the epidural with parasthesias down the left buttock. Patient reports that pain has been constant and feels like "nerve pain". started 1 week ago.  no urinary or bowel issues.  able to ambulate  Physical examination with tenderness to pressure over epidural site but no sensory loss or motor weakness. SLR negative. Hb stable from delivery period.     IMPRESSION   1) Epidural site tenderness with radiating parasthesias to the L. buttock. Likely post-epidural nerve injury vs sciatica. Less likely epidural abscess or hematoma due to normal lab findings or clinical features of infection  2) h/o migraines     RECOMMENDATION   - MRI Lumbar spine with and without contrast  - Pain m/m as per primary team  - outaptient f/u for EMG   - PT/OT   - check FS, glucose control <180  - GI/DVT ppx  - Counseling on diet, exercise, and medication adherence was done  - Counseling on smoking cessation and alcohol consumption offered when appropriate.  - Pain assessed and judicious use of narcotics when appropriate was discussed.    - Stroke education given when appropriate.  - Importance of fall prevention discussed.   - Differential diagnosis and plan of care discussed with patient and/or family and primary team  - Thank you for allowing me to participate in the care of this patient. Call with questions.   Eben Arredondo MD  Vascular Neurology  Office: [phone]

## 2024-08-16 NOTE — CONSULT NOTE ADULT - SUBJECTIVE AND OBJECTIVE BOX
Neurology - Consult Note  -  Spectra: [phone] ([facility]), [phone] ([facility])  -  HPI: Patient GEMMA LOPEZ is a 28y (1995) woman with hx of migraines presented to the ED with symptoms of lower back pain radiating down the left buttock. Patient reports that she had an Epidural for a vaginal delivery 5 weeks ago. During the delivery reports feeling contractions more prominently on the L. side but no complications in delivery. One week ago she started developing pain in the site of the epidural with radiation down the left buttock. Patient reports that pain has been constant and feels like "nerve pain". When asked to characterise pain she speaks of tingling and a burning sensation from the back although the way upto the bottom of the L. buttock. Pain is constant, she notices it more when she's at rest but no aggravating or relieving factors. No fevers, chills, weakness, sensory loss, bowel or bladder complaints.     Allergies:  predniSONE (Unknown)  Lexapro (Vomiting)    PMHx/PSHx/Family Hx: As above, otherwise see below   No pertinent past medical history    Migraines    No pertinent past medical history    Social Hx:  No current use of tobacco, alcohol, or illicit drugs    Medications:  MEDICATIONS  (STANDING):    MEDICATIONS  (PRN):  ketorolac   Injectable 15 milliGRAM(s) IV Push once PRN Moderate Pain (4 - 6)      Vitals:  T(C): 36.8 (08-16-24 @ 04:20), Max: 36.8 (08-16-24 @ 04:20)  HR: 69 (08-16-24 @ 04:20) (61 - 69)  BP: 119/79 (08-16-24 @ 04:20) (119/79 - 125/84)  RR: 18 (08-16-24 @ 04:20) (18 - 18)  SpO2: 99% (08-16-24 @ 04:20) (99% - 99%)    Physical Examination:   General - Pleasant, cooperative   Cardiovascular - No edema  Neurologic Exam:  Mental status - Awake, Alert, Oriented to person, place, and time. Speech fluent. Follows simple and complex commands.     Cranial nerves:  CN II:  Pupils are 4 mm and briskly reactive to light.   CN III, IV, VI: EOMI, no nystagmus, no ptosis  CN V: Facial sensation is intact to pinprick in all 3 divisions bilaterally.  CN VII: Face is symmetric with normal eye closure and smile.  CN VII: Hearing is normal to rubbing fingers  CN IX, X: Palate elevates symmetrically. Phonation is normal.  CN XI: Head turning and shoulder shrug are intact  CN XII: Tongue is midline with normal movements and no atrophy.    Motor - Normal bulk and tone throughout. SLR negative  Strength testing            Deltoid(C5)  Biceps(C6)    Triceps(C7)     Wrist Extension    Wrist Flexion (C8)     Interossei  (T1)         R            5                 5                        5                     5                              5              5                         5  L             5                 5                        5                     5                              5              5                          5              Hip Flexion(L2/3)    Hip Extension (L4/5)   Knee Flexion (L4/5/S1)    Knee Extension (L3/4)       Dorsiflexion (L4/5)   Plantar Flexion (S1)  R              5                                    5                                     5                5                                              5                          5  L              5                                     5                                     5                5                                              5                          5    Sensation - Tenderness to pressure over epidural site. Light touch and  pain intact in B/L UE and LE    DTR's -             Biceps      Triceps     Brachioradialis      Patellar    Ankle    Toes/plantar response  R             2+             2+                  2+             1+            2+                 Down  L              2+             2+                 2+              1+           2+                 Down    Coordination - There is no dysmetria on finger-to-nose and heel-knee-shin.    Gait and station - Posture is normal. Gait is steady with normal steps    Labs:                        13.1   11.07 )-----------( 249      ( 16 Aug 2024 00:27 )             40.2     08-16    138  |  102  |  20  ----------------------------<  103<H>  3.9   |  22  |  0.77    Ca    10.0      16 Aug 2024 00:27    TPro  8.3  /  Alb  4.3  /  TBili  0.2  /  DBili  x   /  AST  17  /  ALT  19  /  AlkPhos  83  08-16    CAPILLARY BLOOD GLUCOSE        LIVER FUNCTIONS - ( 16 Aug 2024 00:27 )  Alb: 4.3 g/dL / Pro: 8.3 g/dL / ALK PHOS: 83 U/L / ALT: 19 U/L / AST: 17 U/L / GGT: x              Neurology - Consult Note  -  Spectra: [phone] ([facility]), [phone] ([facility])  -  HPI: Patient GEMMA LOPEZ is a 28y (1995) woman with hx of migraines presented to the ED with symptoms of lower back pain radiating down the left buttock. Patient reports that she had an Epidural for a vaginal delivery 5 weeks ago. During the delivery reports feeling contractions more prominently on the L. side but no complications in delivery. One week ago she started developing pain in the site of the epidural with radiation down the left buttock. Patient reports that pain has been constant and feels like "nerve pain". When asked to characterise pain she speaks of tingling and a burning sensation from the back although the way upto the bottom of the L. buttock. Pain is constant, she notices it more when she's at rest but no aggravating or relieving factors. No fevers, chills, weakness, sensory loss, hx of lifting heavy weights, bowel or bladder complaints.     Allergies:  predniSONE (Unknown)  Lexapro (Vomiting)    PMHx/PSHx/Family Hx: As above, otherwise see below   No pertinent past medical history    Migraines    No pertinent past medical history    Social Hx:  No current use of tobacco, alcohol, or illicit drugs    Medications:  MEDICATIONS  (STANDING):    MEDICATIONS  (PRN):  ketorolac   Injectable 15 milliGRAM(s) IV Push once PRN Moderate Pain (4 - 6)      Vitals:  T(C): 36.8 (08-16-24 @ 04:20), Max: 36.8 (08-16-24 @ 04:20)  HR: 69 (08-16-24 @ 04:20) (61 - 69)  BP: 119/79 (08-16-24 @ 04:20) (119/79 - 125/84)  RR: 18 (08-16-24 @ 04:20) (18 - 18)  SpO2: 99% (08-16-24 @ 04:20) (99% - 99%)    Physical Examination:   General - Pleasant, cooperative   Cardiovascular - No edema  Neurologic Exam:  Mental status - Awake, Alert, Oriented to person, place, and time. Speech fluent. Follows simple and complex commands.     Cranial nerves:  CN II:  Pupils are 4 mm and briskly reactive to light.   CN III, IV, VI: EOMI, no nystagmus, no ptosis  CN V: Facial sensation is intact to pinprick in all 3 divisions bilaterally.  CN VII: Face is symmetric with normal eye closure and smile.  CN VII: Hearing is normal to rubbing fingers  CN IX, X: Palate elevates symmetrically. Phonation is normal.  CN XI: Head turning and shoulder shrug are intact  CN XII: Tongue is midline with normal movements and no atrophy.    Motor - Normal bulk and tone throughout. SLR negative  Strength testing            Deltoid(C5)  Biceps(C6)    Triceps(C7)     Wrist Extension    Wrist Flexion (C8)     Interossei  (T1)         R            5                 5                        5                     5                              5              5                         5  L             5                 5                        5                     5                              5              5                          5              Hip Flexion(L2/3)    Hip Extension (L4/5)   Knee Flexion (L4/5/S1)    Knee Extension (L3/4)       Dorsiflexion (L4/5)   Plantar Flexion (S1)  R              5                                    5                                     5                5                                              5                          5  L              5                                     5                                     5                5                                              5                          5    Sensation - Tenderness to pressure over epidural site. Light touch and  pain intact in B/L UE and LE    DTR's -             Biceps      Triceps     Brachioradialis      Patellar    Ankle    Toes/plantar response  R             2+             2+                  2+             1+            2+                 Down  L              2+             2+                 2+              1+           2+                 Down    Coordination - There is no dysmetria on finger-to-nose and heel-knee-shin.    Gait and station - Posture is normal. Gait is steady with normal steps    Labs:                        13.1   11.07 )-----------( 249      ( 16 Aug 2024 00:27 )             40.2     08-16    138  |  102  |  20  ----------------------------<  103<H>  3.9   |  22  |  0.77    Ca    10.0      16 Aug 2024 00:27    TPro  8.3  /  Alb  4.3  /  TBili  0.2  /  DBili  x   /  AST  17  /  ALT  19  /  AlkPhos  83  08-16    CAPILLARY BLOOD GLUCOSE        LIVER FUNCTIONS - ( 16 Aug 2024 00:27 )  Alb: 4.3 g/dL / Pro: 8.3 g/dL / ALK PHOS: 83 U/L / ALT: 19 U/L / AST: 17 U/L / GGT: x              Neurology - Consult Note  -  Spectra: [phone] ([facility]), [phone] ([facility])  -  HPI: Patient GEMMA LOPEZ is a 28y (1995) woman with hx of migraines, febrile seizures  presented to the ED with symptoms of lower back pain radiating down the left buttock. Patient reports that she had an Epidural for a vaginal delivery 5 weeks ago. During the delivery reports feeling contractions more prominently on the L. side but no complications in delivery. One week ago she started developing pain in the site of the epidural with radiation down the left buttock. Patient reports that pain has been constant and feels like "nerve pain". When asked to characterise pain she speaks of tingling and a burning sensation from the back although the way upto the bottom of the L. buttock. Pain is constant, she notices it more when she's at rest but no aggravating or relieving factors. No fevers, chills, weakness, sensory loss, hx of lifting heavy weights, bowel or bladder complaints.     Allergies:  predniSONE (Unknown)  Lexapro (Vomiting)    PMHx/PSHx/Family Hx: As above, otherwise see below   No pertinent past medical history    Migraines    No pertinent past medical history    Social Hx:  No current use of tobacco, alcohol, or illicit drugs    Medications:  MEDICATIONS  (STANDING):    MEDICATIONS  (PRN):  ketorolac   Injectable 15 milliGRAM(s) IV Push once PRN Moderate Pain (4 - 6)      Vitals:  T(C): 36.8 (08-16-24 @ 04:20), Max: 36.8 (08-16-24 @ 04:20)  HR: 69 (08-16-24 @ 04:20) (61 - 69)  BP: 119/79 (08-16-24 @ 04:20) (119/79 - 125/84)  RR: 18 (08-16-24 @ 04:20) (18 - 18)  SpO2: 99% (08-16-24 @ 04:20) (99% - 99%)    Physical Examination:   General - Pleasant, cooperative   Cardiovascular - No edema  Neurologic Exam:  Mental status - Awake, Alert, Oriented to person, place, and time. Speech fluent. Follows simple and complex commands.     Cranial nerves:  CN II:  Pupils are 4 mm and briskly reactive to light.   CN III, IV, VI: EOMI, no nystagmus, no ptosis  CN V: Facial sensation is intact to pinprick in all 3 divisions bilaterally.  CN VII: Face is symmetric with normal eye closure and smile.  CN VII: Hearing is normal to rubbing fingers  CN IX, X: Palate elevates symmetrically. Phonation is normal.  CN XI: Head turning and shoulder shrug are intact  CN XII: Tongue is midline with normal movements and no atrophy.    Motor - Normal bulk and tone throughout. SLR negative  Strength testing            Deltoid(C5)  Biceps(C6)    Triceps(C7)     Wrist Extension    Wrist Flexion (C8)     Interossei  (T1)         R            5                 5                        5                     5                              5              5                         5  L             5                 5                        5                     5                              5              5                          5              Hip Flexion(L2/3)    Hip Extension (L4/5)   Knee Flexion (L4/5/S1)    Knee Extension (L3/4)       Dorsiflexion (L4/5)   Plantar Flexion (S1)  R              5                                    5                                     5                5                                              5                          5  L              5                                     5                                     5                5                                              5                          5    Sensation - Tenderness to pressure over epidural site. Light touch and  pain intact in B/L UE and LE    DTR's -             Biceps      Triceps     Brachioradialis      Patellar    Ankle    Toes/plantar response  R             2+             2+                  2+             1+            2+                 Down  L              2+             2+                 2+              1+           2+                 Down    Coordination - There is no dysmetria on finger-to-nose and heel-knee-shin.    Gait and station - Posture is normal. Gait is steady with normal steps    Labs:                        13.1   11.07 )-----------( 249      ( 16 Aug 2024 00:27 )             40.2     08-16    138  |  102  |  20  ----------------------------<  103<H>  3.9   |  22  |  0.77    Ca    10.0      16 Aug 2024 00:27    TPro  8.3  /  Alb  4.3  /  TBili  0.2  /  DBili  x   /  AST  17  /  ALT  19  /  AlkPhos  83  08-16    CAPILLARY BLOOD GLUCOSE        LIVER FUNCTIONS - ( 16 Aug 2024 00:27 )  Alb: 4.3 g/dL / Pro: 8.3 g/dL / ALK PHOS: 83 U/L / ALT: 19 U/L / AST: 17 U/L / GGT: x

## 2024-08-16 NOTE — ED ADULT NURSE REASSESSMENT NOTE - NS ED NURSE REASSESS COMMENT FT1
Report received from Laurie BOOKER. Patient awaiting MRI. MRI screening form faxed and placed in patients chart.

## 2024-08-16 NOTE — ED CDU PROVIDER DISPOSITION NOTE - CARE PROVIDER_API CALL
Eben Arredondo  Neurology  [address], [address]  [place] [address]  Phone: [phone]  Fax: [phone]  Follow Up Time:

## 2024-08-16 NOTE — ED CDU PROVIDER INITIAL DAY NOTE - PROGRESS NOTE DETAILS
Pt comfortable. No new complaints. Feeling much better today. ambulating independently. Vital signs stable. MRI negative. seen by neurology Dr. Arredondo and cleared for discharge with outpt neurology followup. discussed with Dr. Bosch, pt stable for d/c home. -Kinza Mehta PA-C

## 2024-08-16 NOTE — CONSULT NOTE ADULT - ASSESSMENT
ASSESSMENT   Patient GEMMA LOPEZ is a 28y (1995) woman with hx of migraines presented to the ED with symptoms of lower back pain radiating down the left buttock. Patient reports that she had an Epidural for a vaginal delivery 5 weeks ago. Pain in the site of the epidural with parasthesias down the left buttock. Patient reports that pain has been constant and feels like "nerve pain". Physical examination with tenderness to pressure over epidural site but no sensory loss or motor weakness. SLR negative.  IMPRESSION   Epidural site tenderness with radiating parasthesias to the L. buttock. Likely post-epidural nerve injury.     RECOMMENDATION   - MRI Lumbar spine w/wo contrast  - Pain m/m as per primary team        Patient to be seen by team and attending. Note finalized upon attending attestation.  ASSESSMENT   Patient GEMMA LOPEZ is a 28y (1995) woman with hx of migraines presented to the ED with symptoms of lower back pain radiating down the left buttock. Patient reports that she had an Epidural for a vaginal delivery 5 weeks ago. Pain in the site of the epidural with parasthesias down the left buttock. Patient reports that pain has been constant and feels like "nerve pain". Physical examination with tenderness to pressure over epidural site but no sensory loss or motor weakness. SLR negative. Hb stable from delivery period.   IMPRESSION   Epidural site tenderness with radiating parasthesias to the L. buttock. Likely post-epidural nerve injury vs sciatica. Less likely epidural abscess or hematoma due to normal lab findings or clinical features of infection    RECOMMENDATION   - MRI Lumbar spine w/wo contrast  - Pain m/m as per primary team        Patient to be seen by team and attending. Note finalized upon attending attestation.  ASSESSMENT   Patient GEMMA LOPEZ is a 28y (1995) woman with hx of migraines presented to the ED with symptoms of lower back pain radiating down the left buttock. Patient reports that she had an Epidural for a vaginal delivery 5 weeks ago. Pain in the site of the epidural with parasthesias down the left buttock. Patient reports that pain has been constant and feels like "nerve pain". Physical examination with tenderness to pressure over epidural site but no sensory loss or motor weakness. SLR negative. Hb stable from delivery period.     IMPRESSION   Epidural site tenderness with radiating parasthesias to the L. buttock. Likely post-epidural nerve injury vs sciatica. Less likely epidural abscess or hematoma due to normal lab findings or clinical features of infection    RECOMMENDATION   - MRI Lumbar spine without contrast  - Pain m/m as per primary team        Patient to be seen by team and attending in AM. Note finalized upon attending attestation.  ASSESSMENT   Patient GEMMA LOPEZ is a 28y (1995) woman with hx of migraines presented to the ED with symptoms of lower back pain radiating down the left buttock. Patient reports that she had an Epidural for a vaginal delivery 5 weeks ago. Pain in the site of the epidural with parasthesias down the left buttock. Patient reports that pain has been constant and feels like "nerve pain". Physical examination with tenderness to pressure over epidural site but no sensory loss or motor weakness. SLR negative. Hb stable from delivery period.     IMPRESSION   Epidural site tenderness with radiating parasthesias to the L. buttock. Likely post-epidural nerve injury vs sciatica. Less likely epidural abscess or hematoma due to normal lab findings or clinical features of infection    RECOMMENDATION   - MRI Lumbar spine with and without contrast  - Pain m/m as per primary team        Patient to be seen by team and attending in AM. Note finalized upon attending attestation.

## 2024-08-16 NOTE — ED CDU PROVIDER DISPOSITION NOTE - ATTENDING APP SHARED VISIT CONTRIBUTION OF CARE
Dr. Bosch: I performed a face to face bedside interview with patient regarding history of present illness, review of symptoms and past medical history. I completed an independent physical exam.  I have discussed patient's plan of care with AYESHA.   I agree with note as stated above, having amended the EMR as needed to reflect my findings.   This includes HISTORY OF PRESENT ILLNESS, HIV, PAST MEDICAL/SURGICAL/FAMILY/SOCIAL HISTORY, ALLERGIES AND HOME MEDICATIONS, REVIEW OF SYSTEMS, PHYSICAL EXAM, and any PROGRESS NOTES during the time I functioned as the attending physician for this patient.    Dr. Bosch: 28F  5 weeks post partum, s/p epidural at the time, p/w low back pain, atraumatic, worsening since delivery, radiates down left leg occasionally. No bowel or bladder incontinence or retention, no numbness tinging or weakness.    Gen: No acute distress  HEENT: Mucous membranes moist, pink conjunctivae, EOMI  CV: RRR, no clubbing/cyanosis/edema  Resp: CTAB  GI: Abdomen soft, NT, ND. Normal BS. No rebound, no guarding  : No CVAT  Neuro: A&O x 3, moving all 4 extremities  MSK: No spine or joint tenderness to palpation, normal gait  Skin: No rashes    MRI unremarkable, cleared by neuro, will dc with pain ctrl.

## 2024-08-16 NOTE — ED PROVIDER NOTE - ATTENDING CONTRIBUTION TO CARE
This is a 28-year-old young lady who about 5 weeks ago delivered a baby and had an epidural.  For the last week or so she has been developing worsening lower back pain no fever no trauma no injury.  Radiates down the left leg occasionally.  The pain is moderate to severe.  No urinary incontinence she is able to walk no saddle paresthesia.  Awake alert talking no acute distress.  5 out of 5 EHL.  Sensory intact bilateral lower extremity.  Negative straight leg raise.  She has midline spinal tenderness from T2-T5 and the left-sided paraspinal tenderness.  There is no skin changes to the back.  There is no tenderness to the buttock.  No abdominal tenderness.  It is possible that the patient has musculoskeletal lower back pain however it is also possible that there is a complication from the epidural.  I think that this is lower in likelihood as spinal epidural abscess or spinal epidural hematoma is likely to occur more soon after the epidural then is presenting at this time however given the recent nature of the procedure I think that it would be prudent to do an MRI.  I discussed this with the patient and she is agreeing to stay in the emergency department for the performance of the MRI/further testing.  IV Tylenol.  Serial reexaminations and serial pain medication.  She has been taking oral pain medication with minimal relief. This is a 28-year-old young lady who about 5 weeks ago delivered a baby and had an epidural.  For the last week or so she has been developing worsening lower back pain no fever no trauma no injury.  Radiates down the left leg occasionally.  The pain is moderate to severe.  No urinary incontinence she is able to walk no saddle paresthesia. Notes nausea + vomiting resolved.   Awake alert talking no acute distress. 5 out of 5 EHL.  Sensory intact bilateral lower extremity.  Negative straight leg raise.  She has midline spinal tenderness from T2-T5 and the left-sided paraspinal tenderness.  There is no skin changes to the back.  There is no tenderness to the buttock.  No abdominal tenderness.  It is possible that the patient has musculoskeletal lower back pain however it is also possible that there is a complication from the epidural.  I think that this is lower in likelihood as spinal epidural abscess or spinal epidural hematoma is likely to occur more soon after the epidural then is presenting at this time however given the recent nature of the procedure I think that it would be prudent to do an MRI.  I discussed this with the patient and she is agreeing to stay in the emergency department for the performance of the MRI/further testing.  IV Tylenol.  Serial reexaminations and serial pain medication.  She has been taking oral pain medication with minimal relief.

## 2024-08-16 NOTE — ED PROVIDER NOTE - CLINICAL SUMMARY MEDICAL DECISION MAKING FREE TEXT BOX
28-year-old female, no chronic medical conditions, 5 weeks status post vaginal delivery with spinal epidural, presents to ED with complaints of 1 week of low back pain localized to left lower back, radiates down the back of her leg.  Pain worse with laying flat.  Took Motrin at home without relief.  No numbness, tingling, weakness, bowel\bladder incontinence, fevers, chills, CP, SOB.  Patient also reports 2 days of N/V/D with lower abdominal cramping.  Currently no pain.  No sick contacts, fevers, chills.  Vital signs stable.  Patient well-appearing, no acute distress, heart regular rate and rhythm, lungs clear, abdomen soft and nontender, no gross motor or sensory deficits, CN2-12 intact, 5/5 strength x 4, sensation symmetric and intact throughout, +tenderness to diffuse paraspinal and midline lumbar.  Concern for sciatica/msk vs spinal hematoma given tenderness and hx of hematoma.  Will obtain basic labs, give fluids, MRI lumbosacral w and w/o IV contrast.  N/V/D likely viral.  no abdominal tenderness, do not feel abd imaging indicated at this time.

## 2024-08-16 NOTE — ED CDU PROVIDER INITIAL DAY NOTE - ATTENDING CONTRIBUTION TO CARE
Pain control in the ER.  MRI to evaluate for epidural abscess or hematoma as the cause of her pain.  Patient currently does not have any signs or symptoms of cauda equina/cord compression. Pain control in the ER.  MRI to evaluate for epidural abscess or hematoma as the cause of her pain.  Patient currently does not have any signs or symptoms of cauda equina/cord compression.    ATTENDING, Moe HARMON: I have personally performed a face to face diagnostic evaluation on this patient.  I have reviewed the note above and agree with the history, exam, and plan of care, except as noted here. Progress notes and further evaluation to be reviewed by observation and discharging attending.

## 2024-08-16 NOTE — ED CDU PROVIDER DISPOSITION NOTE - PATIENT PORTAL LINK FT
You can access the FollowMyHealth Patient Portal offered by [facility] by registering at the following website: [email or web address]. By joining [employer]’s FollowMyHealth portal, you will also be able to view your health information using other applications (apps) compatible with our system.

## 2024-08-16 NOTE — ED CDU PROVIDER DISPOSITION NOTE - NSFOLLOWUPINSTRUCTIONS_ED_ALL_ED_FT
Hydrate.     Please take Tylenol 650mg and Motrin 600mg every 6 hours as needed for pain. For breakthrough/severe pain you can take Oxycodone 5mg every 6 hours. THIS MEDICATION CAN MAKE YOU DROWSY, PLEASE DO NOT DRIVE ON THIS MEDICATION.    We recommend you follow up with your primary care provider within the next 2-3 days, please bring all of your results with you.     Please return to the Emergency Department with new, worsening, or concerning symptoms, such as:  -Shortness of breath or trouble breathing  -Pressure, pain, tightness in chest  -Facial drooping, arm weakness, or speech difficulty   -Head injury or loss of consciousness   -Nonstop bleeding or an open wound     *More detailed information regarding your visit and discharge can be found by reviewing this packet Stay hydrated.     Take Motrin 600mg every 6 hours as needed for pain. Take with food. You may also take Tylenol 500-1000mg every 6 hours as needed.    Follow-up with Neurologist Dr. Arredondo next week. Call for appointment.   Eben Arredondo  Neurology  [address] [facility] [address]  [place] [address]  Phone: [phone]    We recommend you follow up with your primary care provider within the next 2-3 days, please bring all of your test results with you.     Return to the ER if you have any worsening symptoms, chest pain, difficulty breathing, fevers, chills, weakness, numbness/tingling, bowel or bladder disturbances, or any other concerns.      *More detailed information regarding your visit and discharge can be found by reviewing this packet

## 2024-08-16 NOTE — ED CDU PROVIDER DISPOSITION NOTE - CLINICAL COURSE
Patient placed in obs and provided IV pain medication.  MRI is ordered and pending.  Discussed with radiology.  Awaiting performance. 28-year-old female, no chronic medical conditions, 5 weeks status post vaginal delivery with spinal epidural, presents to ED with complaints of 1 week of low back pain localized to left lower back, radiates down the back of her leg.  Pain worse with laying flat.  Took Motrin at home without relief.  No numbness, tingling, weakness, bowel\bladder incontinence, fevers, chills, CP, SOB.  Patient also reports 2 days of N/V/D with lower abdominal cramping.    ED course: Patient placed in obs and provided IV pain medication.  MRI is ordered and pending.  Discussed with radiology.  Neurology consulted. 28-year-old female, no chronic medical conditions, 5 weeks status post vaginal delivery with spinal epidural, presents to ED with complaints of 1 week of low back pain localized to left lower back, radiates down the back of her leg.  Pain worse with laying flat.  Took Motrin at home without relief.  No numbness, tingling, weakness, bowel\bladder incontinence, fevers, chills, CP, SOB.  Patient also reports 2 days of N/V/D with lower abdominal cramping.    ED course: Patient placed in obs and provided IV pain medication.  MRI is ordered and pending.  Discussed with radiology.  Neurology consulted. MRI negative. pt was discharged home with outpt followup.

## 2024-09-09 ENCOUNTER — TRANSCRIPTION ENCOUNTER (OUTPATIENT)
Age: 29
End: 2024-09-09

## 2024-09-10 ENCOUNTER — TRANSCRIPTION ENCOUNTER (OUTPATIENT)
Age: 29
End: 2024-09-10

## 2024-09-13 ENCOUNTER — TRANSCRIPTION ENCOUNTER (OUTPATIENT)
Age: 29
End: 2024-09-13

## 2024-09-23 ENCOUNTER — TRANSCRIPTION ENCOUNTER (OUTPATIENT)
Age: 29
End: 2024-09-23

## 2024-10-02 ENCOUNTER — APPOINTMENT (OUTPATIENT)
Dept: AFTER HOURS CARE | Facility: EMERGENCY ROOM | Age: 29
End: 2024-10-02
Payer: COMMERCIAL

## 2024-10-02 ENCOUNTER — TRANSCRIPTION ENCOUNTER (OUTPATIENT)
Age: 29
End: 2024-10-02

## 2024-10-02 ENCOUNTER — APPOINTMENT (OUTPATIENT)
Dept: ORTHOPEDIC SURGERY | Facility: CLINIC | Age: 29
End: 2024-10-02

## 2024-10-02 VITALS
HEART RATE: 62 BPM | WEIGHT: 155 LBS | BODY MASS INDEX: 29.27 KG/M2 | HEIGHT: 61 IN | DIASTOLIC BLOOD PRESSURE: 77 MMHG | SYSTOLIC BLOOD PRESSURE: 118 MMHG

## 2024-10-02 DIAGNOSIS — M79.10 MYALGIA, UNSPECIFIED SITE: ICD-10-CM

## 2024-10-02 DIAGNOSIS — M60.9 MYOSITIS, UNSPECIFIED: ICD-10-CM

## 2024-10-02 PROCEDURE — 99204 OFFICE O/P NEW MOD 45 MIN: CPT | Mod: 25

## 2024-10-02 PROCEDURE — 20552 NJX 1/MLT TRIGGER POINT 1/2: CPT

## 2024-10-02 NOTE — HISTORY OF PRESENT ILLNESS
[Stable] : stable [de-identified] : 28 year old female presents for initial evaluation of left sided lower back pain with radiation to the left buttock after an POLI performed during child birth since July.  She denies radiation of pain down the legs.  Denies numbness/tingling. Denies weakness.  Laying down and sitting aggravates the pain.  Takes tylenol and advil for the pain. Biofreeze helps.  She had seen her neurologist who referred her to PT, and she only went once.  Denies POLI. Had went to the ED in August and had MRI Thoracic and MRI Lumbar performed.  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  No fever, chills, sweats, nausea/vomiting. No bowel or bladder dysfunction, no recent weight loss or gain. No night pain. This history is in addition to the intake form that I personally reviewed.

## 2024-10-02 NOTE — DISCUSSION/SUMMARY
[de-identified] : Left sacroiliitis. Discussed all options. I offered an injection after all risks were explained including allergic reaction to an infection under sterile conditions 1 mg of Depo-Medrol and 2 cc of 1% Lidocaine without epinephrine was injected into the painful site. The patient tolerated the procedure well and received significant relief following the injection. (left SI) All options discussed including rest, medicine, home exercise, acupuncture, Chiropractic care, Physical Therapy, Pain management, and last resort surgery. All questions were answered, all alternatives discussed, and the patient is in complete agreement with the treatment plan which the patient contributed to and discussed with me through the shared decision-making process. Follow-up appointment as instructed. Any issues and the patient will call or come in sooner.

## 2024-10-02 NOTE — PHYSICAL EXAM
[Normal] : Gait: normal [Sotelo's Sign] : negative Sotelo's sign [Pronator Drift] : negative pronator drift [SLR] : negative straight leg raise [de-identified] : 5 out of 5 motor strength, sensation is intact and symmetrical full range of motion flexion extension and rotation, no palpatory tenderness full range of motion of hips knees shoulders and elbows (all four extremities), no atrophy, negative straight leg raise, no pathological reflexes, no swelling, normal ambulation, no apparent distress skin is intact, no palpable lymph nodes, no upper or lower extremity instability, alert and oriented x3 and normal mood. Normal finger-to nose test.  No upper motor neuron findings. Left SI joint pain. [de-identified] : I reviewed, interpreted and clinically correlated the following outside imaging studies, MR SPINE LUMBAR WAW IC ORDERED BY: YAZMIN GREGORIO  MR SPINE THORACIC WAW IC ORDERED BY: YAZMIN GREGORIO  PROCEDURE DATE: 08/16/2024    INTERPRETATION: Exam Date: 8/16/2024 10:38 AM  MR thoracic and lumbar with and without gadolinium  CLINICAL INDICATION: midline spinal tendernes s/p epidural 5 weeks ago  TECHNIQUE: Sagittal T1-weighted, T2-weighted, and inversion recovery images, and axial T1 and T2-weighted images of the thoracic and lumbar spine were obtained. Axial and sagittal T1-weighted images of the thoracic and lumbar spine were obtained after the intravenous administration of Gadavist.  FINDINGS: No prior similar studies are available for review.  Thoracic and lumbar studies are reported together.  Thoracic and lumbar vertebral alignment and vertebral body heights are maintained. Marrow signal intensity within the vertebral bodies and posterior elements is unremarkable. No osseous expansion, epidural disease or paraspinal abnormality is found.  Intervertebral thoracic and lumbar discs maintain intact disc heights and signal intensity. No central canal or foraminal compromise is recognized.  The spinal cord maintains intact morphology. Cord signal intensity is intact. No abnormal enhancement.  IMPRESSION:  Unremarkable MR of the thoracic and lumbar spine.  --- End of Report ---

## 2024-10-02 NOTE — ADDENDUM
[FreeTextEntry1] : This note was written by Chad Calderon on 10/02/2024 acting as scribe for Dr. Kirit Bhakta M.D.  I, Kirit Bhakta MD, have read and attest that all the information, medical decision making and discharge instructions within are true and accurate.

## 2024-10-03 PROCEDURE — 99214 OFFICE O/P EST MOD 30 MIN: CPT | Mod: 95

## 2024-10-04 ENCOUNTER — LABORATORY RESULT (OUTPATIENT)
Age: 29
End: 2024-10-04

## 2024-10-04 ENCOUNTER — NON-APPOINTMENT (OUTPATIENT)
Age: 29
End: 2024-10-04

## 2024-10-04 ENCOUNTER — APPOINTMENT (OUTPATIENT)
Dept: ORTHOPEDIC SURGERY | Facility: CLINIC | Age: 29
End: 2024-10-04
Payer: COMMERCIAL

## 2024-10-04 ENCOUNTER — APPOINTMENT (OUTPATIENT)
Dept: INTERNAL MEDICINE | Facility: CLINIC | Age: 29
End: 2024-10-04
Payer: COMMERCIAL

## 2024-10-04 VITALS
DIASTOLIC BLOOD PRESSURE: 62 MMHG | RESPIRATION RATE: 14 BRPM | SYSTOLIC BLOOD PRESSURE: 102 MMHG | OXYGEN SATURATION: 98 % | HEART RATE: 69 BPM

## 2024-10-04 VITALS — BODY MASS INDEX: 29.27 KG/M2 | HEIGHT: 61 IN | WEIGHT: 155 LBS

## 2024-10-04 DIAGNOSIS — N60.19 DIFFUSE CYSTIC MASTOPATHY OF UNSPECIFIED BREAST: ICD-10-CM

## 2024-10-04 DIAGNOSIS — M51.369: ICD-10-CM

## 2024-10-04 DIAGNOSIS — Z00.00 ENCOUNTER FOR GENERAL ADULT MEDICAL EXAMINATION W/OUT ABNORMAL FINDINGS: ICD-10-CM

## 2024-10-04 DIAGNOSIS — M54.50 LOW BACK PAIN, UNSPECIFIED: ICD-10-CM

## 2024-10-04 DIAGNOSIS — S39.012A STRAIN OF MUSCLE, FASCIA AND TENDON OF LOWER BACK, INITIAL ENCOUNTER: ICD-10-CM

## 2024-10-04 PROCEDURE — 36415 COLL VENOUS BLD VENIPUNCTURE: CPT

## 2024-10-04 PROCEDURE — 99212 OFFICE O/P EST SF 10 MIN: CPT | Mod: 25

## 2024-10-04 PROCEDURE — 93000 ELECTROCARDIOGRAM COMPLETE: CPT

## 2024-10-04 PROCEDURE — 99395 PREV VISIT EST AGE 18-39: CPT

## 2024-10-04 PROCEDURE — 99214 OFFICE O/P EST MOD 30 MIN: CPT

## 2024-10-04 NOTE — DISCUSSION/SUMMARY
[de-identified] : Left sacroiliitis. Discussed all options. Feeling better. Going to PT. Slept better last night. F/U after PT. All options discussed including rest, medicine, home exercise, acupuncture, Chiropractic care, Physical Therapy, Pain management, and last resort surgery. All questions were answered, all alternatives discussed, and the patient is in complete agreement with the treatment plan which the patient contributed to and discussed with me through the shared decision-making process. Follow-up appointment as instructed. Any issues and the patient will call or come in sooner.

## 2024-10-04 NOTE — HISTORY OF PRESENT ILLNESS
[Stable] : stable [de-identified] : 28 year old female presents for initial evaluation of left sided lower back pain with radiation to the left buttock after an POLI performed during child birth since July.  She denies radiation of pain down the legs.  Denies numbness/tingling. Denies weakness.  SI joint injection 2 days ago. Laying down and sitting aggravates the pain.  Takes tylenol and advil for the pain. Biofreeze helps.  She had seen her neurologist who referred her to PT, and she only went once.  Denies POLI. Had went to the ED in August and had MRI Thoracic and MRI Lumbar performed.  Was given left SI Joint injection at last visit.  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  No fever, chills, sweats, nausea/vomiting. No bowel or bladder dysfunction, no recent weight loss or gain. No night pain. This history is in addition to the intake form that I personally reviewed.

## 2024-10-04 NOTE — HISTORY OF PRESENT ILLNESS
[Stable] : stable [de-identified] : 28 year old female presents for initial evaluation of left sided lower back pain with radiation to the left buttock after an POLI performed during child birth since July.  She denies radiation of pain down the legs.  Denies numbness/tingling. Denies weakness.  SI joint injection 2 days ago. Laying down and sitting aggravates the pain.  Takes tylenol and advil for the pain. Biofreeze helps.  She had seen her neurologist who referred her to PT, and she only went once.  Denies POLI. Had went to the ED in August and had MRI Thoracic and MRI Lumbar performed.  Was given left SI Joint injection at last visit.  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  No fever, chills, sweats, nausea/vomiting. No bowel or bladder dysfunction, no recent weight loss or gain. No night pain. This history is in addition to the intake form that I personally reviewed.

## 2024-10-04 NOTE — HEALTH RISK ASSESSMENT
[Fair] :  ~his/her~ mood as fair [No] : In the past 12 months have you used drugs other than those required for medical reasons? No [No falls in past year] : Patient reported no falls in the past year [0] : 2) Feeling down, depressed, or hopeless: Not at all (0) [PHQ-2 Negative - No further assessment needed] : PHQ-2 Negative - No further assessment needed [Never] : Never [NO] : No [Patient reported PAP Smear was normal] : Patient reported PAP Smear was normal [HIV test declined] : HIV test declined [Hepatitis C test declined] : Hepatitis C test declined [None] : None [With Family] : lives with family [] :  [# Of Children ___] : has [unfilled] children [Feels Safe at Home] : Feels safe at home [Fully functional (bathing, dressing, toileting, transferring, walking, feeding)] : Fully functional (bathing, dressing, toileting, transferring, walking, feeding) [Fully functional (using the telephone, shopping, preparing meals, housekeeping, doing laundry, using] : Fully functional and needs no help or supervision to perform IADLs (using the telephone, shopping, preparing meals, housekeeping, doing laundry, using transportation, managing medications and managing finances) [Reports normal functional visual acuity (ie: able to read med bottle)] : Reports normal functional visual acuity [de-identified] : Gyn, Neurologist, Orthopedics [de-identified] : physical therapy [de-identified] : admits not eating healthy presently [[id number]] : 0 [Change in mental status noted] : No change in mental status noted [Language] : denies difficulty with language [Handling Complex Tasks] : denies difficulty handling complex tasks [Reports changes in hearing] : Reports no changes in hearing [Reports changes in vision] : Reports no changes in vision [Reports changes in dental health] : Reports no changes in dental health [PapSmearDate] : 8/2024 [PapSmearComments] : per patient [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

## 2024-10-04 NOTE — PHYSICAL EXAM
[Normal] : Gait: normal [Sotelo's Sign] : negative Sotelo's sign [Pronator Drift] : negative pronator drift [SLR] : negative straight leg raise [de-identified] : Adequate motor strength, sensation is intact and symmetrical full range of motion flexion extension and rotation, full range of motion of hips knees shoulders and elbows (all four extremities), no atrophy, negative straight leg raise, no swelling, normal ambulation, no apparent distress skin is intact, no upper or lower extremity instability, alert and oriented x3 and normal mood. Normal finger-to nose test. Adequate heal walk and toe walk.  Left SI joint pain. [de-identified] : I reviewed, interpreted and clinically correlated the following outside imaging studies, MR SPINE LUMBAR WAW IC ORDERED BY: YAZMIN GREGORIO  MR SPINE THORACIC WAW IC ORDERED BY: YAZMIN GREGORIO  PROCEDURE DATE: 08/16/2024    INTERPRETATION: Exam Date: 8/16/2024 10:38 AM  MR thoracic and lumbar with and without gadolinium  CLINICAL INDICATION: midline spinal tendernes s/p epidural 5 weeks ago  TECHNIQUE: Sagittal T1-weighted, T2-weighted, and inversion recovery images, and axial T1 and T2-weighted images of the thoracic and lumbar spine were obtained. Axial and sagittal T1-weighted images of the thoracic and lumbar spine were obtained after the intravenous administration of Gadavist.  FINDINGS: No prior similar studies are available for review.  Thoracic and lumbar studies are reported together.  Thoracic and lumbar vertebral alignment and vertebral body heights are maintained. Marrow signal intensity within the vertebral bodies and posterior elements is unremarkable. No osseous expansion, epidural disease or paraspinal abnormality is found.  Intervertebral thoracic and lumbar discs maintain intact disc heights and signal intensity. No central canal or foraminal compromise is recognized.  The spinal cord maintains intact morphology. Cord signal intensity is intact. No abnormal enhancement.  IMPRESSION:  Unremarkable MR of the thoracic and lumbar spine.  --- End of Report ---

## 2024-10-04 NOTE — HISTORY OF PRESENT ILLNESS
[FreeTextEntry1] : annual physical, c/o left sided back pain, anxiety [de-identified] : GEMMA LOPEZ is a 28 year old female who presents for annual comprehensive exam. She has had left sided low back pain since 1 month postpartum (August) on lux day she went back to the gym she felt a pull/pain in left lower back after working out, went to the ER and then ortho, had Depo-Medrol injection into left SI joint. Since then pain has improved from ~8/10 to 4-5/10, admits now no further pain, just an uncomfortable "tingling" in left back/buttock. She is also going for Acupuncture and PT for her back.  She feels tired and has felt a bit anxious after the birth, just recently started therapy which has been helpful, She does not feel she is depressed. No SI/HI. Enjoys being with her baby and family. She stopped breast-feeding after 1 month. Admits diet has been unhealthy (eating out a lot) and expresses concern as in the past her cholesterol and sugars have been borderline.

## 2024-10-04 NOTE — REASON FOR VISIT
[Medical Office: ([facility])___] : at the medical office located in  [Patient] : the patient [Follow-Up Visit] : a follow-up visit for [Other Location: e.g. Home (Enter Location, City,State)___] : at [unfilled] [Home] : at home, [unfilled] , at the time of the visit.

## 2024-10-04 NOTE — PHYSICAL EXAM
[Normal] : Gait: normal [Sotelo's Sign] : negative Sotelo's sign [Pronator Drift] : negative pronator drift [SLR] : negative straight leg raise [de-identified] : Adequate motor strength, sensation is intact and symmetrical full range of motion flexion extension and rotation, full range of motion of hips knees shoulders and elbows (all four extremities), no atrophy, negative straight leg raise, no swelling, normal ambulation, no apparent distress skin is intact, no upper or lower extremity instability, alert and oriented x3 and normal mood. Normal finger-to nose test. Adequate heal walk and toe walk.  Left SI joint pain. [de-identified] : I reviewed, interpreted and clinically correlated the following outside imaging studies, MR SPINE LUMBAR WAW IC ORDERED BY: YAZMIN GREGORIO  MR SPINE THORACIC WAW IC ORDERED BY: YAZMIN GREGORIO  PROCEDURE DATE: 08/16/2024    INTERPRETATION: Exam Date: 8/16/2024 10:38 AM  MR thoracic and lumbar with and without gadolinium  CLINICAL INDICATION: midline spinal tendernes s/p epidural 5 weeks ago  TECHNIQUE: Sagittal T1-weighted, T2-weighted, and inversion recovery images, and axial T1 and T2-weighted images of the thoracic and lumbar spine were obtained. Axial and sagittal T1-weighted images of the thoracic and lumbar spine were obtained after the intravenous administration of Gadavist.  FINDINGS: No prior similar studies are available for review.  Thoracic and lumbar studies are reported together.  Thoracic and lumbar vertebral alignment and vertebral body heights are maintained. Marrow signal intensity within the vertebral bodies and posterior elements is unremarkable. No osseous expansion, epidural disease or paraspinal abnormality is found.  Intervertebral thoracic and lumbar discs maintain intact disc heights and signal intensity. No central canal or foraminal compromise is recognized.  The spinal cord maintains intact morphology. Cord signal intensity is intact. No abnormal enhancement.  IMPRESSION:  Unremarkable MR of the thoracic and lumbar spine.  --- End of Report ---

## 2024-10-04 NOTE — DISCUSSION/SUMMARY
[de-identified] : Left sacroiliitis. Discussed all options. Feeling better. Going to PT. Slept better last night. F/U after PT. All options discussed including rest, medicine, home exercise, acupuncture, Chiropractic care, Physical Therapy, Pain management, and last resort surgery. All questions were answered, all alternatives discussed, and the patient is in complete agreement with the treatment plan which the patient contributed to and discussed with me through the shared decision-making process. Follow-up appointment as instructed. Any issues and the patient will call or come in sooner.

## 2024-10-04 NOTE — REASON FOR VISIT
[Medical Office: ([facility])___] : at the medical office located in  [Patient] : the patient [Follow-Up Visit] : a follow-up visit for [Other Location: e.g. Home (Enter Location, City,State)___] : at [unfilled] [Home] : at home, [unfilled] , at the time of the visit.

## 2024-10-04 NOTE — PLAN
[FreeTextEntry1] : #Left sided LBP: following with ortho for sacroiliitis, c/o SI joint injection with improved pain; undergoing PT and Acupuncture. F/.u in 1 month assess for progress. #?Anxiety: patient reports h/o anxiety--is following with therapist, f/u 1 month on progress, discussed SSRI; declines meds for now #h/o Vertigo: following with neurologist, stable. #H/o fibrocystic breasts: per patient has yearly sono to monitor--Rx provided, breast exam normal.  PAP smear UTD Flu vaccine, COVID19 booster: discussed, declined Tdap: UTD per pateint

## 2024-10-07 NOTE — HISTORY OF PRESENT ILLNESS
[Home] : at home, [unfilled] , at the time of the visit. [Other Location: e.g. Home (Enter Location, City,State)___] : at [unfilled] [Verbal consent obtained from patient] : the patient, [unfilled] [FreeTextEntry8] : 29 yo female postpartum 3 months for evaluation of low back pain that began 1 month postpartum. Reports pain has been radiating to left buttock. States she was seen in [facility], MRI negative. Went to see a musculoskeletal doc for the pain, had a cortisone injection near low back. Didn't realize she was given a cortisone injection. States she took an oral methylprednisolone recently and had difficulty with sleep afterward. States she is unsure if the injectable will have the same side effect profile to understand if it will cause her the same symptoms. Denies any other symptoms at this time.

## 2024-10-07 NOTE — ASSESSMENT
[FreeTextEntry1] :  acute on chronic sciatic pain without red flag symptoms of back pain, no signs or symptoms of epidural abscess/caude equine/infection

## 2024-10-07 NOTE — HISTORY OF PRESENT ILLNESS
[Home] : at home, [unfilled] , at the time of the visit. [Other Location: e.g. Home (Enter Location, City,State)___] : at [unfilled] [Verbal consent obtained from patient] : the patient, [unfilled] [FreeTextEntry8] : 27 yo female postpartum 3 months for evaluation of low back pain that began 1 month postpartum. Reports pain has been radiating to left buttock. States she was seen in [facility], MRI negative. Went to see a musculoskeletal doc for the pain, had a cortisone injection near low back. Didn't realize she was given a cortisone injection. States she took an oral methylprednisolone recently and had difficulty with sleep afterward. States she is unsure if the injectable will have the same side effect profile to understand if it will cause her the same symptoms. Denies any other symptoms at this time.

## 2024-10-07 NOTE — PLAN
[No new medications perscribed] : Treat in place: No new medications prescribed [FreeTextEntry1] : Take Tylenol or Motrin as discussed for pain management -f/u with PMD or orthopedics -Red flag symptoms for immediate ER evaluation discussed. pt expresses full understanding and agrees.

## 2024-10-09 LAB
25(OH)D3 SERPL-MCNC: 22.6 NG/ML
ALBUMIN SERPL ELPH-MCNC: 4.7 G/DL
ALP BLD-CCNC: 68 U/L
ALT SERPL-CCNC: 15 U/L
ANION GAP SERPL CALC-SCNC: 18 MMOL/L
APPEARANCE: CLEAR
AST SERPL-CCNC: 17 U/L
BASOPHILS # BLD AUTO: 0.04 K/UL
BASOPHILS NFR BLD AUTO: 0.4 %
BILIRUB SERPL-MCNC: 0.3 MG/DL
BILIRUBIN URINE: NEGATIVE
BLOOD URINE: NEGATIVE
BUN SERPL-MCNC: 14 MG/DL
CALCIUM SERPL-MCNC: 9.9 MG/DL
CHLORIDE SERPL-SCNC: 101 MMOL/L
CHOLEST SERPL-MCNC: 247 MG/DL
CO2 SERPL-SCNC: 21 MMOL/L
COLOR: YELLOW
CREAT SERPL-MCNC: 0.75 MG/DL
EGFR: 110 ML/MIN/1.73M2
EOSINOPHIL # BLD AUTO: 0.06 K/UL
EOSINOPHIL NFR BLD AUTO: 0.5 %
ESTIMATED AVERAGE GLUCOSE: 100 MG/DL
GLUCOSE QUALITATIVE U: NEGATIVE MG/DL
GLUCOSE SERPL-MCNC: 87 MG/DL
HBA1C MFR BLD HPLC: 5.1 %
HCT VFR BLD CALC: 39.9 %
HDLC SERPL-MCNC: 55 MG/DL
HGB BLD-MCNC: 13.1 G/DL
IMM GRANULOCYTES NFR BLD AUTO: 0.3 %
KETONES URINE: NEGATIVE MG/DL
LDLC SERPL CALC-MCNC: 166 MG/DL
LEUKOCYTE ESTERASE URINE: ABNORMAL
LYMPHOCYTES # BLD AUTO: 3.83 K/UL
LYMPHOCYTES NFR BLD AUTO: 34.7 %
MAN DIFF?: NORMAL
MCHC RBC-ENTMCNC: 30.3 PG
MCHC RBC-ENTMCNC: 32.8 GM/DL
MCV RBC AUTO: 92.1 FL
MONOCYTES # BLD AUTO: 0.76 K/UL
MONOCYTES NFR BLD AUTO: 6.9 %
NEUTROPHILS # BLD AUTO: 6.31 K/UL
NEUTROPHILS NFR BLD AUTO: 57.2 %
NITRITE URINE: NEGATIVE
NONHDLC SERPL-MCNC: 192 MG/DL
PH URINE: 7
PLATELET # BLD AUTO: 295 K/UL
POTASSIUM SERPL-SCNC: 4.1 MMOL/L
PROT SERPL-MCNC: 8 G/DL
PROTEIN URINE: NEGATIVE MG/DL
RBC # BLD: 4.33 M/UL
RBC # FLD: 13 %
SODIUM SERPL-SCNC: 140 MMOL/L
SPECIFIC GRAVITY URINE: 1.01
TRIGL SERPL-MCNC: 143 MG/DL
TSH SERPL-ACNC: 1.05 UIU/ML
UROBILINOGEN URINE: 0.2 MG/DL
WBC # FLD AUTO: 11.03 K/UL

## 2024-10-23 ENCOUNTER — TRANSCRIPTION ENCOUNTER (OUTPATIENT)
Age: 29
End: 2024-10-23

## 2024-10-29 ENCOUNTER — RESULT REVIEW (OUTPATIENT)
Age: 29
End: 2024-10-29

## 2024-10-29 ENCOUNTER — APPOINTMENT (OUTPATIENT)
Dept: ULTRASOUND IMAGING | Facility: CLINIC | Age: 29
End: 2024-10-29
Payer: COMMERCIAL

## 2024-10-29 PROCEDURE — 76641 ULTRASOUND BREAST COMPLETE: CPT | Mod: 50

## 2024-11-01 ENCOUNTER — TRANSCRIPTION ENCOUNTER (OUTPATIENT)
Age: 29
End: 2024-11-01

## 2024-11-05 ENCOUNTER — LABORATORY RESULT (OUTPATIENT)
Age: 29
End: 2024-11-05

## 2024-11-05 ENCOUNTER — APPOINTMENT (OUTPATIENT)
Dept: INTERNAL MEDICINE | Facility: CLINIC | Age: 29
End: 2024-11-05

## 2024-11-05 DIAGNOSIS — Z23 ENCOUNTER FOR IMMUNIZATION: ICD-10-CM

## 2024-11-05 DIAGNOSIS — S39.012A STRAIN OF MUSCLE, FASCIA AND TENDON OF LOWER BACK, INITIAL ENCOUNTER: ICD-10-CM

## 2024-11-05 DIAGNOSIS — R82.90 UNSPECIFIED ABNORMAL FINDINGS IN URINE: ICD-10-CM

## 2024-11-05 PROCEDURE — 90656 IIV3 VACC NO PRSV 0.5 ML IM: CPT

## 2024-11-05 PROCEDURE — G0008: CPT

## 2024-11-05 PROCEDURE — 99214 OFFICE O/P EST MOD 30 MIN: CPT | Mod: 25

## 2024-11-11 ENCOUNTER — APPOINTMENT (OUTPATIENT)
Dept: ORTHOPEDIC SURGERY | Facility: CLINIC | Age: 29
End: 2024-11-11

## 2024-11-13 ENCOUNTER — TRANSCRIPTION ENCOUNTER (OUTPATIENT)
Age: 29
End: 2024-11-13

## 2024-11-13 ENCOUNTER — APPOINTMENT (OUTPATIENT)
Dept: ORTHOPEDIC SURGERY | Facility: CLINIC | Age: 29
End: 2024-11-13

## 2024-11-13 LAB
APPEARANCE: CLEAR
BILIRUBIN URINE: NEGATIVE
BLOOD URINE: NEGATIVE
COLOR: NORMAL
GLUCOSE QUALITATIVE U: NEGATIVE
KETONES URINE: NEGATIVE
LEUKOCYTE ESTERASE URINE: ABNORMAL
NITRITE URINE: NEGATIVE
PH URINE: 6
PROTEIN URINE: NEGATIVE
SPECIFIC GRAVITY URINE: 1
UROBILINOGEN URINE: NORMAL

## 2024-11-19 ENCOUNTER — EMERGENCY (EMERGENCY)
Facility: HOSPITAL | Age: 29
LOS: 1 days | Discharge: HOME | End: 2024-11-19
Attending: STUDENT IN AN ORGANIZED HEALTH CARE EDUCATION/TRAINING PROGRAM
Payer: COMMERCIAL

## 2024-11-19 VITALS
HEART RATE: 89 BPM | TEMPERATURE: 98 F | RESPIRATION RATE: 16 BRPM | SYSTOLIC BLOOD PRESSURE: 111 MMHG | OXYGEN SATURATION: 98 % | DIASTOLIC BLOOD PRESSURE: 71 MMHG

## 2024-11-19 VITALS
TEMPERATURE: 98 F | WEIGHT: 119.93 LBS | OXYGEN SATURATION: 98 % | HEART RATE: 94 BPM | DIASTOLIC BLOOD PRESSURE: 83 MMHG | RESPIRATION RATE: 18 BRPM | SYSTOLIC BLOOD PRESSURE: 121 MMHG

## 2024-11-19 DIAGNOSIS — Z98.890 OTHER SPECIFIED POSTPROCEDURAL STATES: Chronic | ICD-10-CM

## 2024-11-19 LAB
ALBUMIN SERPL ELPH-MCNC: 4.2 G/DL — SIGNIFICANT CHANGE UP (ref 3.3–5)
ALP SERPL-CCNC: 67 U/L — SIGNIFICANT CHANGE UP (ref 40–120)
ALT FLD-CCNC: 26 U/L — SIGNIFICANT CHANGE UP (ref 10–45)
ANION GAP SERPL CALC-SCNC: 12 MMOL/L — SIGNIFICANT CHANGE UP (ref 5–17)
AST SERPL-CCNC: 19 U/L — SIGNIFICANT CHANGE UP (ref 10–40)
BASOPHILS # BLD AUTO: 0.03 K/UL — SIGNIFICANT CHANGE UP (ref 0–0.2)
BASOPHILS NFR BLD AUTO: 0.3 % — SIGNIFICANT CHANGE UP (ref 0–2)
BILIRUB SERPL-MCNC: 0.2 MG/DL — SIGNIFICANT CHANGE UP (ref 0.2–1.2)
BUN SERPL-MCNC: 6 MG/DL — LOW (ref 7–23)
CALCIUM SERPL-MCNC: 9.4 MG/DL — SIGNIFICANT CHANGE UP (ref 8.4–10.5)
CHLORIDE SERPL-SCNC: 103 MMOL/L — SIGNIFICANT CHANGE UP (ref 96–108)
CO2 SERPL-SCNC: 24 MMOL/L — SIGNIFICANT CHANGE UP (ref 22–31)
CREAT SERPL-MCNC: 0.65 MG/DL — SIGNIFICANT CHANGE UP (ref 0.5–1.3)
EGFR: 121 ML/MIN/1.73M2 — SIGNIFICANT CHANGE UP
EOSINOPHIL # BLD AUTO: 0.08 K/UL — SIGNIFICANT CHANGE UP (ref 0–0.5)
EOSINOPHIL NFR BLD AUTO: 0.7 % — SIGNIFICANT CHANGE UP (ref 0–6)
FLUAV AG NPH QL: SIGNIFICANT CHANGE UP
FLUBV AG NPH QL: SIGNIFICANT CHANGE UP
GLUCOSE SERPL-MCNC: 97 MG/DL — SIGNIFICANT CHANGE UP (ref 70–99)
HCG SERPL-ACNC: <2 MIU/ML — SIGNIFICANT CHANGE UP
HCT VFR BLD CALC: 37.6 % — SIGNIFICANT CHANGE UP (ref 34.5–45)
HGB BLD-MCNC: 12.5 G/DL — SIGNIFICANT CHANGE UP (ref 11.5–15.5)
IMM GRANULOCYTES NFR BLD AUTO: 0.4 % — SIGNIFICANT CHANGE UP (ref 0–0.9)
LYMPHOCYTES # BLD AUTO: 2.49 K/UL — SIGNIFICANT CHANGE UP (ref 1–3.3)
LYMPHOCYTES # BLD AUTO: 21.7 % — SIGNIFICANT CHANGE UP (ref 13–44)
MAGNESIUM SERPL-MCNC: 2.1 MG/DL — SIGNIFICANT CHANGE UP (ref 1.6–2.6)
MCHC RBC-ENTMCNC: 29.9 PG — SIGNIFICANT CHANGE UP (ref 27–34)
MCHC RBC-ENTMCNC: 33.2 G/DL — SIGNIFICANT CHANGE UP (ref 32–36)
MCV RBC AUTO: 90 FL — SIGNIFICANT CHANGE UP (ref 80–100)
MONOCYTES # BLD AUTO: 0.87 K/UL — SIGNIFICANT CHANGE UP (ref 0–0.9)
MONOCYTES NFR BLD AUTO: 7.6 % — SIGNIFICANT CHANGE UP (ref 2–14)
NEUTROPHILS # BLD AUTO: 7.93 K/UL — HIGH (ref 1.8–7.4)
NEUTROPHILS NFR BLD AUTO: 69.3 % — SIGNIFICANT CHANGE UP (ref 43–77)
NRBC # BLD: 0 /100 WBCS — SIGNIFICANT CHANGE UP (ref 0–0)
PHOSPHATE SERPL-MCNC: 2.6 MG/DL — SIGNIFICANT CHANGE UP (ref 2.5–4.5)
PLATELET # BLD AUTO: 238 K/UL — SIGNIFICANT CHANGE UP (ref 150–400)
POTASSIUM SERPL-MCNC: 4.1 MMOL/L — SIGNIFICANT CHANGE UP (ref 3.5–5.3)
POTASSIUM SERPL-SCNC: 4.1 MMOL/L — SIGNIFICANT CHANGE UP (ref 3.5–5.3)
PROT SERPL-MCNC: 8.1 G/DL — SIGNIFICANT CHANGE UP (ref 6–8.3)
RBC # BLD: 4.18 M/UL — SIGNIFICANT CHANGE UP (ref 3.8–5.2)
RBC # FLD: 12.1 % — SIGNIFICANT CHANGE UP (ref 10.3–14.5)
RSV RNA NPH QL NAA+NON-PROBE: SIGNIFICANT CHANGE UP
SARS-COV-2 RNA SPEC QL NAA+PROBE: SIGNIFICANT CHANGE UP
SODIUM SERPL-SCNC: 139 MMOL/L — SIGNIFICANT CHANGE UP (ref 135–145)
WBC # BLD: 11.45 K/UL — HIGH (ref 3.8–10.5)
WBC # FLD AUTO: 11.45 K/UL — HIGH (ref 3.8–10.5)

## 2024-11-19 PROCEDURE — 71046 X-RAY EXAM CHEST 2 VIEWS: CPT

## 2024-11-19 PROCEDURE — 99284 EMERGENCY DEPT VISIT MOD MDM: CPT | Mod: 25

## 2024-11-19 PROCEDURE — 87637 SARSCOV2&INF A&B&RSV AMP PRB: CPT

## 2024-11-19 PROCEDURE — 80053 COMPREHEN METABOLIC PANEL: CPT

## 2024-11-19 PROCEDURE — 99284 EMERGENCY DEPT VISIT MOD MDM: CPT

## 2024-11-19 PROCEDURE — 96365 THER/PROPH/DIAG IV INF INIT: CPT

## 2024-11-19 PROCEDURE — 83735 ASSAY OF MAGNESIUM: CPT

## 2024-11-19 PROCEDURE — 85025 COMPLETE CBC W/AUTO DIFF WBC: CPT

## 2024-11-19 PROCEDURE — 96375 TX/PRO/DX INJ NEW DRUG ADDON: CPT

## 2024-11-19 PROCEDURE — 84702 CHORIONIC GONADOTROPIN TEST: CPT

## 2024-11-19 PROCEDURE — 71046 X-RAY EXAM CHEST 2 VIEWS: CPT | Mod: 26

## 2024-11-19 PROCEDURE — 84100 ASSAY OF PHOSPHORUS: CPT

## 2024-11-19 RX ORDER — ONDANSETRON HYDROCHLORIDE 2 MG/ML
4 INJECTION, SOLUTION INTRAMUSCULAR; INTRAVENOUS ONCE
Refills: 0 | Status: COMPLETED | OUTPATIENT
Start: 2024-11-19 | End: 2024-11-19

## 2024-11-19 RX ORDER — AMOXICILLIN AND CLAVULANATE POTASSIUM 600; 42.9 MG/5ML; MG/5ML
875 POWDER, FOR SUSPENSION ORAL
Qty: 14 | Refills: 0
Start: 2024-11-19 | End: 2024-11-25

## 2024-11-19 RX ORDER — AMOXICILLIN AND CLAVULANATE POTASSIUM 600; 42.9 MG/5ML; MG/5ML
1 POWDER, FOR SUSPENSION ORAL ONCE
Refills: 0 | Status: COMPLETED | OUTPATIENT
Start: 2024-11-19 | End: 2024-11-19

## 2024-11-19 RX ORDER — ACETAMINOPHEN 500 MG
1000 TABLET ORAL ONCE
Refills: 0 | Status: COMPLETED | OUTPATIENT
Start: 2024-11-19 | End: 2024-11-19

## 2024-11-19 RX ORDER — AZITHROMYCIN DIHYDRATE 200 MG/5ML
500 POWDER, FOR SUSPENSION ORAL ONCE
Refills: 0 | Status: DISCONTINUED | OUTPATIENT
Start: 2024-11-19 | End: 2024-11-19

## 2024-11-19 RX ORDER — SODIUM CHLORIDE 9 MG/ML
1000 INJECTION, SOLUTION INTRAMUSCULAR; INTRAVENOUS; SUBCUTANEOUS ONCE
Refills: 0 | Status: COMPLETED | OUTPATIENT
Start: 2024-11-19 | End: 2024-11-19

## 2024-11-19 RX ADMIN — ONDANSETRON HYDROCHLORIDE 4 MILLIGRAM(S): 2 INJECTION, SOLUTION INTRAMUSCULAR; INTRAVENOUS at 14:18

## 2024-11-19 RX ADMIN — SODIUM CHLORIDE 1000 MILLILITER(S): 9 INJECTION, SOLUTION INTRAMUSCULAR; INTRAVENOUS; SUBCUTANEOUS at 14:15

## 2024-11-19 RX ADMIN — Medication 1000 MILLIGRAM(S): at 14:35

## 2024-11-19 RX ADMIN — AMOXICILLIN AND CLAVULANATE POTASSIUM 1 TABLET(S): 600; 42.9 POWDER, FOR SUSPENSION ORAL at 16:11

## 2024-11-19 RX ADMIN — Medication 1 LOZENGE: at 14:19

## 2024-11-19 RX ADMIN — Medication 400 MILLIGRAM(S): at 14:19

## 2024-11-19 NOTE — ED ADULT NURSE NOTE - OBJECTIVE STATEMENT
1355 28 yr old WF c/o fever, sore throat, productive cough, body aches, N/V x 4 days. A&Ox4. Ambulatory. Fall risk precautions maintained. c/o feeling dizzy today/. Color pink. Skin W&D. Droplet isolation maintained

## 2024-11-19 NOTE — ED PROVIDER NOTE - ATTENDING CONTRIBUTION TO CARE
I have personally performed a face to face medical and diagnostic evaluation of the patient. I have discussed with and reviewed the Resident's and/or ACP's and/or Medical/PA/NP student's note and agree with the History, ROS, Physical Exam and MDM unless otherwise indicated. A brief summary of my personal evaluation and impression can be found below.     28F PMH migraine presenting to ED with 4 days of dry cough, nasal congestion, nausea and decreased appetite. Reports fever initially which has since resolved. Pt with generalized weakness and fatigue, pt daughter had similar sx and was treated with augmentin with improvement. Reports mild sore throat from coughing. No abd pain or urinary sx. PE shows RRR, lungs cta. Neuro intact. abd soft/nt. Mild pharyngeal erythema, no tonisillar exudates or erythema. No edema at le, distal pulses 2+ bl. Given hx and physical, ddx includes but is not limited to viral syndrome, flu, covid, pneumonia, metabolic derangement. Low concern for uti (no urinary sx). Low concern for acute intra-abd pathology (no abd pain, abd soft/nt). Plan for labs, xr, meds, ivf, reassess.

## 2024-11-19 NOTE — ED ADULT NURSE REASSESSMENT NOTE - NS ED NURSE REASSESS COMMENT FT1
Report received from ADE Patrick. Pt is well-appearing, sitting up in bed. Pt ambulated to bathroom.

## 2024-11-19 NOTE — ED PROVIDER NOTE - PATIENT PORTAL LINK FT
You can access the FollowMyHealth Patient Portal offered by [facility] by registering at the following website: [email or web address]. By joining [employer]’s FollowMyHealth portal, you will also be able to view your health information using other applications (apps) compatible with our system.

## 2024-11-19 NOTE — ED ADULT NURSE NOTE - NSFALLRISKINTERV_ED_ALL_ED
Assistance OOB with selected safe patient handling equipment if applicable/Assistance with ambulation/Communicate fall risk and risk factors to all staff, patient, and family/Monitor gait and stability/Provide visual cue: yellow wristband, yellow gown, etc/Reinforce activity limits and safety measures with patient and family/Call bell, personal items and telephone in reach/Instruct patient to call for assistance before getting out of bed/chair/stretcher/Non-slip footwear applied when patient is off stretcher/[place] to call system/Physically safe environment - no spills, clutter or unnecessary equipment/Purposeful Proactive Rounding/Room/bathroom lighting operational, light cord in reach

## 2024-11-19 NOTE — ED ADULT NURSE REASSESSMENT NOTE - NS ED NURSE REASSESS COMMENT FT1
Patient d/c. Reviewed d/c paperwork with patients, all questions answered at this time. Patient verbalizes understanding. IV removed. Patient instructed to return to the ER for any worsening s/s including chest pain, SOB, fever, n/v/d. Patient alert and stable at time of d/c. Educated on dc medications.

## 2024-11-19 NOTE — ED PROVIDER NOTE - PROGRESS NOTE DETAILS
Shirley Lockett M.D. (Resident Physician): Pt has a right lobar pna. Abx given and will dc wit abx and pmd f/u.

## 2024-11-19 NOTE — ED PROVIDER NOTE - CLINICAL SUMMARY MEDICAL DECISION MAKING FREE TEXT BOX
28 female past medical history migraines presenting with viral symptoms.  Patient says that 4 days ago she started having sore throat, cough, occasional nausea.  Also had a fever with Tmax 101 that has resolved.  Has not taken any Tylenol or NSAIDs today.  Only has chest pain when coughing.  Denies shortness of breath, vomiting, urinary symptoms, diarrhea.  Her child recently had mycoplasma pneumonia.  Vitals nonactionable.  On exam: No acute distress.  Slight erythema in the posterior oropharynx with no tonsillar exudates.  Heart regular rate and rhythm.  Lungs clear to auscultation bilaterally.  Abdomen soft, nontender, nondistended.  No lower extremity edema.  Differential diagnosis includes but not limited to: URI, pneumonia.  Plan: Blood work, chest x-ray, viral swab, IV fluids.  Will reassess.  Likely discharge.

## 2024-11-19 NOTE — ED PROVIDER NOTE - NSFOLLOWUPINSTRUCTIONS_ED_ALL_ED_FT
You have been evaluated in the Emergency Department today for cough and fever. Your evaluation showed that you have right pneumonia.    Please  and take the antibiotics as prescribed.    For pain/fever, you can take TYLENOL/ACETAMINOPHEN up to 4,000mg a day for your symptoms in four divided doses and MOTRIN/IBUPROFEN up to 2,400mg a day in four divided doses (for up to 5 days with food).    Please schedule an appointment with your primary care physician within 2-3 days.    Return to the Emergency Department if you experience difficulty breathing, recurrent vomiting, or any other concerning symptoms.    Thank you for choosing us for your care.

## 2024-12-16 ENCOUNTER — APPOINTMENT (OUTPATIENT)
Dept: ORTHOPEDIC SURGERY | Facility: CLINIC | Age: 29
End: 2024-12-16
Payer: COMMERCIAL

## 2024-12-16 DIAGNOSIS — S39.012A STRAIN OF MUSCLE, FASCIA AND TENDON OF LOWER BACK, INITIAL ENCOUNTER: ICD-10-CM

## 2024-12-16 PROCEDURE — 99203 OFFICE O/P NEW LOW 30 MIN: CPT

## 2024-12-16 RX ORDER — TIZANIDINE 2 MG/1
2 TABLET ORAL EVERY 6 HOURS
Qty: 56 | Refills: 1 | Status: ACTIVE | COMMUNITY
Start: 2024-12-16 | End: 1900-01-01

## 2024-12-16 RX ORDER — MELOXICAM 15 MG/1
15 TABLET ORAL DAILY
Qty: 14 | Refills: 0 | Status: ACTIVE | COMMUNITY
Start: 2024-12-16 | End: 1900-01-01

## 2024-12-28 ENCOUNTER — EMERGENCY (EMERGENCY)
Facility: HOSPITAL | Age: 29
LOS: 1 days | Discharge: HOME | End: 2024-12-28
Attending: STUDENT IN AN ORGANIZED HEALTH CARE EDUCATION/TRAINING PROGRAM
Payer: COMMERCIAL

## 2024-12-28 VITALS
WEIGHT: 149.91 LBS | RESPIRATION RATE: 18 BRPM | OXYGEN SATURATION: 100 % | HEIGHT: 61 IN | TEMPERATURE: 98 F | SYSTOLIC BLOOD PRESSURE: 131 MMHG | DIASTOLIC BLOOD PRESSURE: 82 MMHG | HEART RATE: 86 BPM

## 2024-12-28 DIAGNOSIS — Z98.890 OTHER SPECIFIED POSTPROCEDURAL STATES: Chronic | ICD-10-CM

## 2024-12-28 PROCEDURE — 93971 EXTREMITY STUDY: CPT | Mod: 26,LT

## 2024-12-28 PROCEDURE — 93971 EXTREMITY STUDY: CPT

## 2024-12-28 PROCEDURE — 99284 EMERGENCY DEPT VISIT MOD MDM: CPT | Mod: 25

## 2024-12-28 PROCEDURE — 99284 EMERGENCY DEPT VISIT MOD MDM: CPT

## 2024-12-28 RX ORDER — ACETAMINOPHEN 500MG 500 MG/1
975 TABLET, COATED ORAL ONCE
Refills: 0 | Status: COMPLETED | OUTPATIENT
Start: 2024-12-28 | End: 2024-12-28

## 2024-12-28 RX ORDER — LIDOCAINE 40 MG/G
1 CREAM TOPICAL ONCE
Refills: 0 | Status: COMPLETED | OUTPATIENT
Start: 2024-12-28 | End: 2024-12-28

## 2024-12-28 RX ORDER — CYCLOBENZAPRINE HCL 10 MG
5 TABLET ORAL ONCE
Refills: 0 | Status: COMPLETED | OUTPATIENT
Start: 2024-12-28 | End: 2024-12-28

## 2024-12-28 RX ORDER — CELECOXIB 200 MG/1
200 CAPSULE ORAL ONCE
Refills: 0 | Status: COMPLETED | OUTPATIENT
Start: 2024-12-28 | End: 2024-12-28

## 2024-12-28 RX ADMIN — Medication 5 MILLIGRAM(S): at 14:17

## 2024-12-28 RX ADMIN — LIDOCAINE 1 PATCH: 40 CREAM TOPICAL at 14:17

## 2024-12-28 RX ADMIN — CELECOXIB 200 MILLIGRAM(S): 200 CAPSULE ORAL at 14:17

## 2024-12-28 RX ADMIN — ACETAMINOPHEN 500MG 975 MILLIGRAM(S): 500 TABLET, COATED ORAL at 14:17

## 2024-12-28 NOTE — ED PROVIDER NOTE - CLINICAL SUMMARY MEDICAL DECISION MAKING FREE TEXT BOX
Attending Louise Johns MD: 29-year-old female with past medical history of migraines presents with left lower extremity pain.  She reports over the past few months, since she delivered her child earlier this year, she has had symptoms of left-sided pain, paresthesias burning sensation overlying her left lateral leg.  She reports that she underwent MRIs of her spine and knee and was told that she had a bulging disc in her lumbar spine area.  She is also undergoing workup with her chiropractor and spine doctor who said that she might have piriformis syndrome.  She reports the last couple days, she has worsening left lateral lower extremity burning sensation and spasms in her calf.  She reports taking Tylenol with minimal alleviation of her symptoms.  She states that she is able to ambulate since the onset of symptoms.    PE: well appearing, nontoxic, no respiratory distress.  No edema or erythema of LLE. Neuro nonfocal.  Skin intact. Psych normal mood.    MDM: Differential diagnosis includes but is not limited to neuropathy, DVT, bakers cyst, MSK strain   Will assess with US and provide celecoxib, patient home medication

## 2024-12-28 NOTE — ED ADULT TRIAGE NOTE - TEMPERATURE IN FAHRENHEIT (DEGREES F)
Encounter Date: 10/6/2024       History     Chief Complaint   Patient presents with    Contractions     Katey Cedillo is a 35 y.o. [id number] at 40w3d presents complaining of contractions.    Patient reports onset of contractions since 2 am, originally intermittent but now occurring every 5 min. Patient reports she is progressively becoming more uncomfortable through these contractions. She denies vaginal bleeding, denies LOF.   Fetal Movement: normal    This IUP is complicated by AMA, H/o depression         Review of patient's allergies indicates:  No Known Allergies  Past Medical History:   Diagnosis Date    Depression     --alternative treatments ( hospitalization x 1)     Pilonidal cyst with abscess 2019    I&D  during pregnancy     No past surgical history on file.  Family History   Problem Relation Name Age of Onset    Breast cancer Paternal Grandmother          > 49 y/o at diagnosis    Breast cancer Cousin Radha         40's at diagnosis    Hypertension Neg Hx      Cancer Neg Hx      Ovarian cancer Neg Hx      Colon cancer Neg Hx       Social History     Tobacco Use    Smoking status: Never    Smokeless tobacco: Never   Substance Use Topics    Alcohol use: Not Currently    Drug use: Never     Review of Systems   Constitutional:  Negative for fever.   HENT:  Negative for sore throat.    Respiratory:  Negative for shortness of breath.    Cardiovascular:  Negative for chest pain.   Gastrointestinal:  Negative for diarrhea, nausea and vomiting.   Genitourinary:  Positive for pelvic pain. Negative for dyspareunia, dysuria, hematuria, vaginal bleeding and vaginal discharge.   Musculoskeletal:  Negative for back pain.   Skin:  Negative for rash.   Neurological:  Negative for weakness, light-headedness and headaches.   Hematological:  Does not bruise/bleed easily.       Physical Exam     Initial Vitals [10/06/24 0754]   BP Pulse Resp Temp SpO2   118/61 71 16 98.3 °F (36.8 °C) 100 %      MAP       --          Physical Exam    Constitutional: She appears well-developed and well-nourished.   HENT:   Head: Normocephalic and atraumatic.   Eyes: EOM are normal. Pupils are equal, round, and reactive to light.   Neck:   Normal range of motion.  Cardiovascular:  Normal rate.           Pulmonary/Chest: No respiratory distress.   Abdominal: Abdomen is soft. There is no abdominal tenderness. There is no rebound.   Musculoskeletal:         General: Normal range of motion.      Cervical back: Normal range of motion.     Neurological: She is alert and oriented to person, place, and time.   Skin: Skin is warm and dry. Capillary refill takes less than 2 seconds.   Psychiatric: She has a normal mood and affect.     OB LABOR EXAM:     Membranes ruptured: No.         Dilatation: 6.   Station: -1.   Effacement: 60%.             ED Course   Obtain Fetal nonstress test (NST)    Date/Time: 10/6/2024 8:49 AM    Performed by: Aidee Muniz MD  Authorized by: Aidee Muniz MD    Nonstress Test:     Variability:  6-25 BPM    Decelerations:  None    Accelerations:  15 bpm    Baseline:  145    Contractions:  Regular    Contraction Frequency:  3-5  Biophysical Profile:     Nonstress Test Interpretation: reactive      Overall Impression:  Reassuring  Post-procedure:     Patient tolerance:  Patient tolerated the procedure well with no immediate complications    Labs Reviewed   CBC W/ AUTO DIFFERENTIAL - Abnormal       Result Value    WBC 9.42      RBC 3.66 (*)     Hemoglobin 10.7 (*)     Hematocrit 32.0 (*)     MCV 87      MCH 29.2      MCHC 33.4      RDW 13.6      Platelets 247      MPV 9.7      Immature Granulocytes 1.0 (*)     Gran # (ANC) 6.5      Immature Grans (Abs) 0.09 (*)     Lymph # 2.1      Mono # 0.6      Eos # 0.0      Baso # 0.05      nRBC 0      Gran % 69.4      Lymph % 22.1      Mono % 6.6      Eosinophil % 0.4      Basophil % 0.5      Differential Method Automated     TREPONEMA PALLIDIUM ANTIBODIES IGG, IGM           Imaging Results    None          Medications   levonorgestreL (Mirena) 52 mg IUD 1 Intra Uterine Device (has no administration in time range)   ferrous sulfate tablet 1 each (has no administration in time range)   lactated ringers bolus 1,000 mL (has no administration in time range)   lactated ringers bolus 500 mL (has no administration in time range)   lactated ringers infusion ( Intravenous New Bag 10/6/24 2527)   0.9%  NaCl infusion (has no administration in time range)   ondansetron disintegrating tablet 8 mg (has no administration in time range)   calcium carbonate 200 mg calcium (500 mg) chewable tablet 500 mg (has no administration in time range)   simethicone chewable tablet 80 mg (has no administration in time range)   LIDOcaine HCL 10 mg/ml (1%) injection 10 mL (has no administration in time range)   oxytocin 30 units/500 mL (60 milliunits/mL) in 0.9% NaCl IV bolus from bag (has no administration in time range)   oxytocin 30 units/500 mL (60 milliunits/mL) in 0.9% NaCl (non-titrating) (has no administration in time range)   oxytocin injection 10 Units (has no administration in time range)   miSOPROStoL tablet 800 mcg (has no administration in time range)   miSOPROStoL tablet 800 mcg (has no administration in time range)   methylergonovine injection 200 mcg (has no administration in time range)   carboprost injection 250 mcg (has no administration in time range)   tranexamic acid in NaCl,iso-os IVPB 1,000 mg (has no administration in time range)   diphenoxylate-atropine 2.5-0.025 mg per tablet 2 tablet (has no administration in time range)   lactated ringers bolus 500 mL (has no administration in time range)   oxytocin 30 units/500 mL (60 milliunits/mL) in 0.9% NaCl (TITRATING) (has no administration in time range)   fentanyl 2mcg/mL with BUPivacaine 0.1% in sdoium chloride 0.9% Epidural 2 mcg/mL- 0.1 % Soln (  Override pull for Anesthesia 10/6/24 5355)     Medical Decision Making  Katey Cedillo is  a 35 y.o. [id number] at 40w3d presents complaining of contractions.    1. Uterine CTX   - VSS  - PE: As stated above, SVE: 6/70/-1  - NST/toco: 145 bpm, mod marija, +accels/-decels, ctx 5 min     Admitted to L&D for management of labor. See H&P for further details.     Aidee Muniz  [facility]   OBGYN PGY1      Amount and/or Complexity of Data Reviewed  Labs: ordered.    Risk  OTC drugs.  Prescription drug management.              Attending Attestation:   Physician Attestation Statement for Resident:  As the supervising MD   Physician Attestation Statement: I have personally seen and examined this patient.   I agree with the above history.  -:   As the supervising MD I agree with the above PE.     As the supervising MD I agree with the above treatment, course, plan, and disposition.   I was personally present during the critical portions of the procedure(s) performed by the resident and was immediately available in the ED to provide services and assistance as needed during the entire procedure.  I have reviewed and agree with the residents interpretation of the following: lab data.  I have reviewed the following: old records at this facility.                                       Clinical Impression:  Final diagnoses:  [O80, Z37.9] Normal labor (Primary)  [Z3A.40] 40 weeks gestation of pregnancy          ED Disposition Condition    Send to L&D                 Aidee Muniz MD  Resident  10/06/24 4155       Radha Brown MD  10/06/24 7937     97.9

## 2024-12-28 NOTE — ED PROVIDER NOTE - PROGRESS NOTE DETAILS
patient requests resources for exercises that are not medications for possible sciatica. patient given resources on [facility] back program.

## 2024-12-28 NOTE — ED ADULT NURSE NOTE - NSFALLUNIVINTERV_ED_ALL_ED
Bed/Stretcher in lowest position, wheels locked, appropriate side rails in place/Call bell, personal items and telephone in reach/Instruct patient to call for assistance before getting out of bed/chair/stretcher/Non-slip footwear applied when patient is off stretcher/[place] to call system/Physically safe environment - no spills, clutter or unnecessary equipment/Purposeful proactive rounding/Room/bathroom lighting operational, light cord in reach

## 2024-12-28 NOTE — ED ADULT NURSE NOTE - NSICDXPASTMEDICALHX_GEN_ALL_CORE_FT
Presents with his father for influenza vaccine. Denies current illness or fever. Denies allergic reaction to vaccine or it's components. Denies past  history of Guillain [place] syndrome.    VIS given and reviewed. Verbal consent for vaccine obtained. Tolerated without incident.     ROSA Hernandez    
PAST MEDICAL HISTORY:  Migraines     Seizure

## 2024-12-28 NOTE — ED ADULT NURSE NOTE - OBJECTIVE STATEMENT
29 year old female with history of migraine headaches and chronic back issues- bulging discs in lumbar spine, diagnosed with MRI outpatient, presents to the ED for leg spasms and pain which has been uncontrolled with OTC meds. patient endorses taking Tylenol with partial but minimal relief of pain prior to coming to ED. Md and NP at bedside for assessment and discussion of plan of care. Patient undressed and placed into gown, call bell in hand and side rails up with bed in lowest position for safety. blanket provided. Comfort and safety provided.

## 2024-12-28 NOTE — ED PROVIDER NOTE - PATIENT PORTAL LINK FT
You can access the FollowMyHealth Patient Portal offered by [facility] by registering at the following website: [email or web address]. By joining [employer]’s FollowMyHealth portal, you will also be able to view your health information using other applications (apps) compatible with our system.

## 2024-12-28 NOTE — ED PROVIDER NOTE - NSFOLLOWUPINSTRUCTIONS_ED_ALL_ED_FT
- You were seen in the emergency department today for left leg pain. Your imaging did not indicate any clot nor did your physical exam indicate a dislocation or fracture. Your symptoms are suggestive of a lumbar radiculopathy as previous seen in your spine MRIs.    - Lab and imaging results, if performed, were discussed with you along with your discharge diagnosis    - Follow up with your doctor in 1 week - bring copies of your results if you were given. If you are supposed  to follow up with a specialist, please bring your results with you as well.     - Return to the ED for any new, worsening, or concerning symptoms to you. This includes inability to urinate, pass stools, feel your gential area.    - Continue all prescribed medications.    - Take ibuprofen/tylenol as directed as needed for pain.     - Rest and keep yourself hydrated with fluids.

## 2025-01-13 ENCOUNTER — APPOINTMENT (OUTPATIENT)
Dept: ORTHOPEDIC SURGERY | Facility: CLINIC | Age: 30
End: 2025-01-13

## 2025-02-05 NOTE — OB PROVIDER IHI INDUCTION/AUGMENTATION NOTE - LABOR: CERVICAL EFFACEMENT
Pt here for mood problem follow-up. Pt has past history of anxiety and depression. She has been taking Lexapro and recently increased her medication to 20mg. She is also on Wellbutrin 300mg. She has been using xanax .25 mg for episodes of feeling panicky. The amount she uses varies based on the day, It can be a few times a day based on stressors. She sometimes needs to take 3-4 to have symptoms relieved. Work has been stressful and this has added to the anxiety. She also reports depression that she is not sure which one comes first. Since the increase to 20mg of the lexapro she has had less episodes of panic. She usually sees another FP provider for management.         Past Medical History:   Diagnosis Date    Anxiety 2004    Cervical high risk HPV (human papillomavirus) test positive 01/14/2016    Chlamydia 06/04/2013    Chronic pain     ABD/left shoulder neck    Depression     High school - no issues currently    LGSIL on Pap smear of cervix 05/01/2015    MONONUCLEOSIS 08/13/2007     They are currently taking   Current Outpatient Medications   Medication Sig    ALPRAZolam (XANAX) 1 MG tablet Take 1 tablet by mouth nightly as needed for Sleep.    escitalopram (LEXAPRO) 20 MG tablet Take 1 tablet by mouth daily.    hydrOXYzine (ATARAX) 25 MG tablet Take 1 tablet by mouth every 6 hours as needed for Anxiety.    norethindrone-ethinyl estradiol-iron (MICROGESTIN FE1.5/30) 1.5-30 MG-MCG tablet Take 1 tablet by mouth daily.    ciprofloxacin (CIPRO) 0.3 % ophthalmic solution Place 2 drops into both eyes every 4 hours for 5 days (Patient not taking: Reported on 2/5/2025)    naproxen (NAPROSYN) 500 MG tablet Take 1 tablet by mouth in the morning and 1 tablet in the evening.    aluminum chloride (DRYSOL) 20 % topical solution Apply nightly to affected areas. Apply to dry skin and wash off in the morning. Do not apply to wounds or broken, irritated, or recently shaved skin    clindamycin 1 % gel Apply topically daily.  (Patient not taking: Reported on 10/8/2024)    tretinoin (RETIN-A) 0.05 % cream Apply thin layer to entire face, nightly. Do not use during pregnancy. (Patient not taking: Reported on 10/8/2024)    buPROPion XL (WELLBUTRIN XL) 300 MG 24 hr tablet Take 1 tablet (300 mg total) by mouth every morning.    terconazole 0.4 % vaginal cream Place 1 applicator vaginally nightly. (Patient not taking: Reported on 10/8/2024)    Vitamin D, Ergocalciferol, 50 mcg (2,000 units) capsule Take 1 capsule by mouth daily.     No current facility-administered medications for this visit.     Social History     Tobacco Use   Smoking Status Former    Current packs/day: 0.00    Average packs/day: 0.5 packs/day for 8.0 years (4.0 ttl pk-yrs)    Types: Cigarettes    Start date: 3/20/2007    Quit date: 3/20/2015    Years since quittin.8   Smokeless Tobacco Current   Tobacco Comments    Vaping --refused counseling         Most Recent SEAN 7 Score       SEAN 7 Score SEAN 7 Score   2025  11:40 AM 7       Recent PHQ 2/9 Score    PHQ 2:  PHQ 2 Score Adult PHQ 2 Score Adult PHQ 2 Interpretation Little interest or pleasure in activity?   2025  11:34 AM 2 No further screening needed 0       PHQ 9:  PHQ 9 Score Adult PHQ 9 Score Adult PHQ 9 Interpretation   2025  11:34 AM 6 Mild Depression         Exam:  Vitals:    25 1108 25 1129   BP: (!) 144/79 116/77   Pulse: 81    Temp: 98.1 °F (36.7 °C)    SpO2: 97%    LMP: 2024     Heart:RRR, normal s1 and s2  Lungs:Clear    Plan:  Anxiety  (primary encounter diagnosis)  Plan: ALPRAZolam (XANAX) 1 MG tablet, escitalopram         (LEXAPRO) 20 MG tablet, hydrOXYzine (ATARAX) 25        MG tablet  Discussed keeping her at 20mg for awhile longer to see full affect of dose change. Will add atarax to take three times a day to help manage anxiety and reduce use of xanax. Xanax dose increased and instructed to only use when really needed. Follow-up in 1 month and then will decide if  Gabapentin is needed for an add on.    Mild major depression (CMD)  Plan: See above    Medications sent to preferred pharmacy. Common side effects covered. Questions and concerns addressed.      STEVEN Garcia       0%

## 2025-02-25 ENCOUNTER — APPOINTMENT (OUTPATIENT)
Dept: ORTHOPEDIC SURGERY | Facility: CLINIC | Age: 30
End: 2025-02-25
Payer: COMMERCIAL

## 2025-02-25 DIAGNOSIS — M22.2X2 PATELLOFEMORAL DISORDERS, LEFT KNEE: ICD-10-CM

## 2025-02-25 DIAGNOSIS — M25.562 PAIN IN RIGHT KNEE: ICD-10-CM

## 2025-02-25 DIAGNOSIS — M22.2X1 PATELLOFEMORAL DISORDERS, RIGHT KNEE: ICD-10-CM

## 2025-02-25 DIAGNOSIS — M25.561 PAIN IN RIGHT KNEE: ICD-10-CM

## 2025-02-25 DIAGNOSIS — M54.16 RADICULOPATHY, LUMBAR REGION: ICD-10-CM

## 2025-02-25 PROCEDURE — 99213 OFFICE O/P EST LOW 20 MIN: CPT

## 2025-02-25 PROCEDURE — 73564 X-RAY EXAM KNEE 4 OR MORE: CPT | Mod: 50

## 2025-02-26 ENCOUNTER — APPOINTMENT (OUTPATIENT)
Dept: ORTHOPEDIC SURGERY | Facility: CLINIC | Age: 30
End: 2025-02-26
Payer: COMMERCIAL

## 2025-02-26 VITALS — BODY MASS INDEX: 27.94 KG/M2 | WEIGHT: 148 LBS | HEIGHT: 61 IN

## 2025-02-26 PROBLEM — M22.2X1 PATELLOFEMORAL STRESS SYNDROME OF RIGHT KNEE: Status: ACTIVE | Noted: 2025-02-25

## 2025-02-26 PROBLEM — M54.16 LEFT LUMBAR RADICULITIS: Status: ACTIVE | Noted: 2025-02-26

## 2025-02-26 PROBLEM — M22.2X2 PATELLOFEMORAL STRESS SYNDROME OF LEFT KNEE: Status: ACTIVE | Noted: 2025-02-25

## 2025-02-26 PROCEDURE — 99214 OFFICE O/P EST MOD 30 MIN: CPT

## 2025-02-26 PROCEDURE — 72170 X-RAY EXAM OF PELVIS: CPT

## 2025-02-26 PROCEDURE — 72110 X-RAY EXAM L-2 SPINE 4/>VWS: CPT

## 2025-02-26 RX ORDER — GABAPENTIN 100 MG/1
100 CAPSULE ORAL
Qty: 30 | Refills: 2 | Status: ACTIVE | COMMUNITY
Start: 2025-02-26 | End: 1900-01-01

## 2025-04-07 ENCOUNTER — APPOINTMENT (OUTPATIENT)
Dept: ORTHOPEDIC SURGERY | Facility: CLINIC | Age: 30
End: 2025-04-07
Payer: COMMERCIAL

## 2025-04-07 DIAGNOSIS — M22.2X1 PATELLOFEMORAL DISORDERS, RIGHT KNEE: ICD-10-CM

## 2025-04-07 DIAGNOSIS — M25.532 PAIN IN LEFT WRIST: ICD-10-CM

## 2025-04-07 DIAGNOSIS — M22.2X2 PATELLOFEMORAL DISORDERS, LEFT KNEE: ICD-10-CM

## 2025-04-07 DIAGNOSIS — M25.531 PAIN IN RIGHT WRIST: ICD-10-CM

## 2025-04-07 PROCEDURE — 99214 OFFICE O/P EST MOD 30 MIN: CPT | Mod: 25

## 2025-04-07 PROCEDURE — 20611 DRAIN/INJ JOINT/BURSA W/US: CPT | Mod: LT

## 2025-04-07 RX ORDER — METHYLPRED ACET/NACL,ISO-OS/PF 40 MG/ML
40 VIAL (ML) INJECTION
Refills: 0 | Status: COMPLETED | OUTPATIENT
Start: 2025-04-07

## 2025-04-07 RX ORDER — LIDOCAINE HCL/PF 10 MG/ML
1 VIAL (ML) INJECTION
Refills: 0 | Status: COMPLETED | OUTPATIENT
Start: 2025-04-07

## 2025-04-07 RX ADMIN — LIDOCAINE HYDROCHLORIDE 3 %: 10 INJECTION, SOLUTION INFILTRATION; PERINEURAL at 00:00

## 2025-04-07 RX ADMIN — METHYLPREDNISOLONE ACETATE 2 MG/ML: 40 INJECTION, SUSPENSION INTRA-ARTICULAR; INTRALESIONAL; INTRAMUSCULAR; SOFT TISSUE at 00:00

## 2025-04-11 DIAGNOSIS — M67.431 GANGLION, RIGHT WRIST: ICD-10-CM

## 2025-04-21 ENCOUNTER — APPOINTMENT (OUTPATIENT)
Dept: ORTHOPEDIC SURGERY | Facility: CLINIC | Age: 30
End: 2025-04-21
Payer: COMMERCIAL

## 2025-04-21 DIAGNOSIS — M22.2X2 PATELLOFEMORAL DISORDERS, LEFT KNEE: ICD-10-CM

## 2025-04-21 DIAGNOSIS — M17.12 UNILATERAL PRIMARY OSTEOARTHRITIS, LEFT KNEE: ICD-10-CM

## 2025-04-21 DIAGNOSIS — M22.2X1 PATELLOFEMORAL DISORDERS, RIGHT KNEE: ICD-10-CM

## 2025-04-21 PROCEDURE — 99213 OFFICE O/P EST LOW 20 MIN: CPT

## 2025-04-21 RX ORDER — HYALURONATE SODIUM 20 MG/2 ML
20 SYRINGE (ML) INTRAARTICULAR
Qty: 3 | Refills: 0 | Status: ACTIVE | OUTPATIENT
Start: 2025-04-21

## 2025-05-06 ENCOUNTER — APPOINTMENT (OUTPATIENT)
Dept: INTERNAL MEDICINE | Facility: CLINIC | Age: 30
End: 2025-05-06
Payer: COMMERCIAL

## 2025-05-06 VITALS
RESPIRATION RATE: 14 BRPM | DIASTOLIC BLOOD PRESSURE: 68 MMHG | OXYGEN SATURATION: 98 % | HEART RATE: 70 BPM | SYSTOLIC BLOOD PRESSURE: 110 MMHG

## 2025-05-06 DIAGNOSIS — E78.5 HYPERLIPIDEMIA, UNSPECIFIED: ICD-10-CM

## 2025-05-06 DIAGNOSIS — R53.83 OTHER FATIGUE: ICD-10-CM

## 2025-05-06 DIAGNOSIS — E55.9 VITAMIN D DEFICIENCY, UNSPECIFIED: ICD-10-CM

## 2025-05-06 PROCEDURE — 99213 OFFICE O/P EST LOW 20 MIN: CPT

## 2025-05-06 PROCEDURE — 36415 COLL VENOUS BLD VENIPUNCTURE: CPT

## 2025-05-09 LAB
25(OH)D3 SERPL-MCNC: 23.3 NG/ML
ALBUMIN SERPL ELPH-MCNC: 4.6 G/DL
ALP BLD-CCNC: 66 U/L
ALT SERPL-CCNC: 19 U/L
ANION GAP SERPL CALC-SCNC: 15 MMOL/L
AST SERPL-CCNC: 20 U/L
BILIRUB SERPL-MCNC: 0.4 MG/DL
BUN SERPL-MCNC: 10 MG/DL
CALCIUM SERPL-MCNC: 9.4 MG/DL
CHLORIDE SERPL-SCNC: 103 MMOL/L
CHOLEST SERPL-MCNC: 221 MG/DL
CO2 SERPL-SCNC: 20 MMOL/L
CREAT SERPL-MCNC: 0.71 MG/DL
EGFRCR SERPLBLD CKD-EPI 2021: 117 ML/MIN/1.73M2
ESTIMATED AVERAGE GLUCOSE: 100 MG/DL
FERRITIN SERPL-MCNC: 48 NG/ML
FOLATE SERPL-MCNC: 5.5 NG/ML
GLUCOSE SERPL-MCNC: 83 MG/DL
HBA1C MFR BLD HPLC: 5.1 %
HDLC SERPL-MCNC: 51 MG/DL
IRON SATN MFR SERPL: 33 %
IRON SERPL-MCNC: 102 UG/DL
LDLC SERPL-MCNC: 151 MG/DL
MAGNESIUM SERPL-MCNC: 2.2 MG/DL
NONHDLC SERPL-MCNC: 171 MG/DL
POTASSIUM SERPL-SCNC: 4 MMOL/L
PROT SERPL-MCNC: 7.9 G/DL
SODIUM SERPL-SCNC: 138 MMOL/L
TIBC SERPL-MCNC: 306 UG/DL
TRIGL SERPL-MCNC: 109 MG/DL
UIBC SERPL-MCNC: 204 UG/DL
VIT B12 SERPL-MCNC: 733 PG/ML

## 2025-05-13 ENCOUNTER — OUTPATIENT (OUTPATIENT)
Dept: OUTPATIENT SERVICES | Facility: HOSPITAL | Age: 30
LOS: 1 days | End: 2025-05-13
Payer: COMMERCIAL

## 2025-05-13 VITALS
OXYGEN SATURATION: 99 % | TEMPERATURE: 98 F | HEART RATE: 70 BPM | HEIGHT: 61 IN | WEIGHT: 149.91 LBS | RESPIRATION RATE: 14 BRPM | DIASTOLIC BLOOD PRESSURE: 80 MMHG | SYSTOLIC BLOOD PRESSURE: 120 MMHG

## 2025-05-13 DIAGNOSIS — Z98.890 OTHER SPECIFIED POSTPROCEDURAL STATES: Chronic | ICD-10-CM

## 2025-05-13 DIAGNOSIS — M67.431 GANGLION, RIGHT WRIST: ICD-10-CM

## 2025-05-13 DIAGNOSIS — Z01.818 ENCOUNTER FOR OTHER PREPROCEDURAL EXAMINATION: ICD-10-CM

## 2025-05-13 LAB
HCT VFR BLD CALC: 37.6 % — SIGNIFICANT CHANGE UP (ref 34.5–45)
HGB BLD-MCNC: 12.8 G/DL — SIGNIFICANT CHANGE UP (ref 11.5–15.5)
MCHC RBC-ENTMCNC: 30.3 PG — SIGNIFICANT CHANGE UP (ref 27–34)
MCHC RBC-ENTMCNC: 34 G/DL — SIGNIFICANT CHANGE UP (ref 32–36)
MCV RBC AUTO: 89.1 FL — SIGNIFICANT CHANGE UP (ref 80–100)
NRBC BLD AUTO-RTO: 0 /100 WBCS — SIGNIFICANT CHANGE UP (ref 0–0)
PLATELET # BLD AUTO: 300 K/UL — SIGNIFICANT CHANGE UP (ref 150–400)
RBC # BLD: 4.22 M/UL — SIGNIFICANT CHANGE UP (ref 3.8–5.2)
RBC # FLD: 12.6 % — SIGNIFICANT CHANGE UP (ref 10.3–14.5)
WBC # BLD: 8.34 K/UL — SIGNIFICANT CHANGE UP (ref 3.8–10.5)
WBC # FLD AUTO: 8.34 K/UL — SIGNIFICANT CHANGE UP (ref 3.8–10.5)

## 2025-05-13 PROCEDURE — G0463: CPT

## 2025-05-13 PROCEDURE — 36415 COLL VENOUS BLD VENIPUNCTURE: CPT

## 2025-05-13 PROCEDURE — 85027 COMPLETE CBC AUTOMATED: CPT

## 2025-05-13 NOTE — H&P PST ADULT - NSICDXPASTMEDICALHX_GEN_ALL_CORE_FT
PAST MEDICAL HISTORY:  Bilateral patellofemoral syndrome     Ganglion cyst of wrist, right     Migraines     Seizure in childhood

## 2025-05-13 NOTE — H&P PST ADULT - MUSCULOSKELETAL COMMENTS
right wrist ; ganglion cyst Right wrist ; dorsal aspect 1.5 cm ganglion cyst , npn tender on palpation right wrist ;painful  ganglion cyst Right wrist dorsal ganglion cyst present approximately 1.5 cm in diameter and tender to palpation..

## 2025-05-13 NOTE — H&P PST ADULT - HISTORY OF PRESENT ILLNESS
28 y/o female who presents with right wrist painful ganglion cyst . she noticed the cyst 3 months ago . scheduled for excision dorsal ganglion cyst right wrist 5/30/25

## 2025-05-13 NOTE — H&P PST ADULT - MUSCULOSKELETAL
details… left knee ; swollen , tenderness on palpation , pain with ROM/decreased ROM/decreased ROM due to pain

## 2025-05-13 NOTE — H&P PST ADULT - PROBLEM SELECTOR PLAN 1
scheduled for excision of dorsal ganglion cysr right wrist on 5/30/25  pre op instructions on wash   UCG on admit scheduled for excision of dorsal ganglion cyst right wrist on 5/30/25  pre op instructions on wash   UCG on admit

## 2025-05-28 RX ORDER — APREPITANT 40 MG/1
40 CAPSULE ORAL ONCE
Refills: 0 | Status: DISCONTINUED | OUTPATIENT
Start: 2025-05-30 | End: 2025-05-30

## 2025-05-30 ENCOUNTER — APPOINTMENT (OUTPATIENT)
Dept: ORTHOPEDIC SURGERY | Facility: HOSPITAL | Age: 30
End: 2025-05-30

## 2025-05-30 ENCOUNTER — RESULT REVIEW (OUTPATIENT)
Age: 30
End: 2025-05-30

## 2025-05-30 ENCOUNTER — TRANSCRIPTION ENCOUNTER (OUTPATIENT)
Age: 30
End: 2025-05-30

## 2025-05-30 ENCOUNTER — OUTPATIENT (OUTPATIENT)
Dept: OUTPATIENT SERVICES | Facility: HOSPITAL | Age: 30
LOS: 1 days | End: 2025-05-30
Payer: COMMERCIAL

## 2025-05-30 VITALS
TEMPERATURE: 98 F | DIASTOLIC BLOOD PRESSURE: 70 MMHG | SYSTOLIC BLOOD PRESSURE: 99 MMHG | WEIGHT: 148.15 LBS | RESPIRATION RATE: 18 BRPM | HEART RATE: 59 BPM | OXYGEN SATURATION: 100 % | HEIGHT: 61 IN

## 2025-05-30 VITALS
HEART RATE: 61 BPM | RESPIRATION RATE: 13 BRPM | SYSTOLIC BLOOD PRESSURE: 116 MMHG | OXYGEN SATURATION: 99 % | DIASTOLIC BLOOD PRESSURE: 75 MMHG

## 2025-05-30 DIAGNOSIS — Z98.890 OTHER SPECIFIED POSTPROCEDURAL STATES: Chronic | ICD-10-CM

## 2025-05-30 DIAGNOSIS — M67.431 GANGLION, RIGHT WRIST: ICD-10-CM

## 2025-05-30 LAB — HCG UR QL: NEGATIVE — SIGNIFICANT CHANGE UP

## 2025-05-30 PROCEDURE — 25111 REMOVE WRIST TENDON LESION: CPT | Mod: RT

## 2025-05-30 PROCEDURE — 88304 TISSUE EXAM BY PATHOLOGIST: CPT | Mod: 26

## 2025-05-30 PROCEDURE — 88304 TISSUE EXAM BY PATHOLOGIST: CPT

## 2025-05-30 PROCEDURE — 81025 URINE PREGNANCY TEST: CPT

## 2025-05-30 RX ORDER — HYDROMORPHONE/SOD CHLOR,ISO/PF 2 MG/10 ML
1 SYRINGE (ML) INJECTION
Refills: 0 | Status: DISCONTINUED | OUTPATIENT
Start: 2025-05-30 | End: 2025-05-30

## 2025-05-30 RX ORDER — HYDROMORPHONE/SOD CHLOR,ISO/PF 2 MG/10 ML
0.5 SYRINGE (ML) INJECTION
Refills: 0 | Status: DISCONTINUED | OUTPATIENT
Start: 2025-05-30 | End: 2025-05-30

## 2025-05-30 RX ORDER — CEFAZOLIN SODIUM IN 0.9 % NACL 3 G/100 ML
2000 INTRAVENOUS SOLUTION, PIGGYBACK (ML) INTRAVENOUS ONCE
Refills: 0 | Status: COMPLETED | OUTPATIENT
Start: 2025-05-30 | End: 2025-05-30

## 2025-05-30 RX ORDER — OXYCODONE HYDROCHLORIDE 30 MG/1
5 TABLET ORAL ONCE
Refills: 0 | Status: DISCONTINUED | OUTPATIENT
Start: 2025-05-30 | End: 2025-05-30

## 2025-05-30 RX ORDER — SODIUM CHLORIDE 9 G/1000ML
1000 INJECTION, SOLUTION INTRAVENOUS
Refills: 0 | Status: DISCONTINUED | OUTPATIENT
Start: 2025-05-30 | End: 2025-05-30

## 2025-05-30 RX ORDER — OXYCODONE HYDROCHLORIDE AND ACETAMINOPHEN 10; 325 MG/1; MG/1
1 TABLET ORAL
Qty: 12 | Refills: 0
Start: 2025-05-30 | End: 2025-05-31

## 2025-05-30 RX ORDER — ONDANSETRON HCL/PF 4 MG/2 ML
4 VIAL (ML) INJECTION ONCE
Refills: 0 | Status: DISCONTINUED | OUTPATIENT
Start: 2025-05-30 | End: 2025-05-30

## 2025-05-30 RX ADMIN — SODIUM CHLORIDE 75 MILLILITER(S): 9 INJECTION, SOLUTION INTRAVENOUS at 14:32

## 2025-05-30 NOTE — ASU PATIENT PROFILE, ADULT - FALL HARM RISK - UNIVERSAL INTERVENTIONS
Bed in lowest position, wheels locked, appropriate side rails in place/Call bell, personal items and telephone in reach/Instruct patient to call for assistance before getting out of bed or chair/Non-slip footwear when patient is out of bed/Welcome to call system/Physically safe environment - no spills, clutter or unnecessary equipment/Purposeful Proactive Rounding/Room/bathroom lighting operational, light cord in reach

## 2025-05-30 NOTE — ASU DISCHARGE PLAN (ADULT/PEDIATRIC) - ASU DC SPECIAL INSTRUCTIONSFT
Call MD for severe pain/fever/chills.    Elevate extremity to decrease pain/swelling.    Pain medication as needed, take with food & a stool softener to avoid constipation.

## 2025-05-30 NOTE — ASU DISCHARGE PLAN (ADULT/PEDIATRIC) - NS MD DC FALL RISK RISK
For information on Fall & Injury Prevention, visit: [email or web address] OR  [email or web address] OR  https://www.cdc.gov/steadi/patient.html

## 2025-05-30 NOTE — ASU PREOP CHECKLIST - WARM FLUIDS/WARM BLANKETS
no Xenograft Text: The defect edges were debeveled with a #15c scalpel blade.  Given the location of the defect, shape of the defect and the proximity to free margins a xenograft was deemed most appropriate.  The graft was then trimmed to fit the size of the defect.  The graft was then placed in the primary defect and oriented appropriately.

## 2025-05-30 NOTE — ASU DISCHARGE PLAN (ADULT/PEDIATRIC) - FINANCIAL ASSISTANCE
[facility] provides services at a reduced cost to those who are determined to be eligible through [facility]’s financial assistance program. Information regarding [facility]’s financial assistance program can be found by going to [email or web address] or by calling [phone].

## 2025-05-30 NOTE — ASU DISCHARGE PLAN (ADULT/PEDIATRIC) - ACCOMPANIED BY
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
Family

## 2025-05-30 NOTE — ASU DISCHARGE PLAN (ADULT/PEDIATRIC) - CARE PROVIDER_API CALL
Paul Muniz  Orthopaedic Surgery  [address]  [place] [address]  Phone: [phone]  Fax: [phone]  Follow Up Time:

## 2025-06-02 PROBLEM — M22.2X2 PATELLOFEMORAL DISORDERS, LEFT KNEE: Chronic | Status: ACTIVE | Noted: 2025-05-13

## 2025-06-02 PROBLEM — M67.431 GANGLION, RIGHT WRIST: Chronic | Status: ACTIVE | Noted: 2025-05-13

## 2025-06-02 PROBLEM — R56.9 UNSPECIFIED CONVULSIONS: Chronic | Status: ACTIVE | Noted: 2025-05-13

## 2025-06-03 LAB — SURGICAL PATHOLOGY STUDY: SIGNIFICANT CHANGE UP

## 2025-06-09 ENCOUNTER — APPOINTMENT (OUTPATIENT)
Dept: ORTHOPEDIC SURGERY | Facility: CLINIC | Age: 30
End: 2025-06-09
Payer: COMMERCIAL

## 2025-06-09 PROCEDURE — 99024 POSTOP FOLLOW-UP VISIT: CPT
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
## 2025-07-22 RX ORDER — HYALURONATE SODIUM 20 MG/2 ML
20 SYRINGE (ML) INTRAARTICULAR
Qty: 3 | Refills: 0 | Status: ACTIVE | COMMUNITY
Start: 2025-07-22 | End: 1900-01-01

## 2025-07-23 RX ORDER — HYALURONATE SODIUM 20 MG/2 ML
20 SYRINGE (ML) INTRAARTICULAR
Qty: 3 | Refills: 0 | Status: ACTIVE | OUTPATIENT
Start: 2025-07-23

## 2025-07-28 ENCOUNTER — APPOINTMENT (OUTPATIENT)
Dept: ORTHOPEDIC SURGERY | Facility: CLINIC | Age: 30
End: 2025-07-28
Payer: COMMERCIAL

## 2025-07-28 PROCEDURE — 20611 DRAIN/INJ JOINT/BURSA W/US: CPT | Mod: 79,LT

## 2025-07-28 RX ORDER — HYALURONATE SODIUM 20 MG/2 ML
20 SYRINGE (ML) INTRAARTICULAR
Refills: 0 | Status: COMPLETED | OUTPATIENT
Start: 2025-07-28

## 2025-07-28 RX ADMIN — Medication 2 MG/2ML: at 00:00

## 2025-08-06 ENCOUNTER — APPOINTMENT (OUTPATIENT)
Dept: ORTHOPEDIC SURGERY | Facility: CLINIC | Age: 30
End: 2025-08-06
Payer: COMMERCIAL

## 2025-08-06 PROCEDURE — 20611 DRAIN/INJ JOINT/BURSA W/US: CPT | Mod: 79,LT

## 2025-08-06 RX ORDER — HYALURONATE SODIUM 20 MG/2 ML
20 SYRINGE (ML) INTRAARTICULAR
Refills: 0 | Status: COMPLETED | OUTPATIENT
Start: 2025-08-06

## 2025-08-06 RX ADMIN — Medication 0 MG/2ML: at 00:00

## 2025-08-13 ENCOUNTER — APPOINTMENT (OUTPATIENT)
Dept: ORTHOPEDIC SURGERY | Facility: CLINIC | Age: 30
End: 2025-08-13
Payer: COMMERCIAL

## 2025-08-13 DIAGNOSIS — M17.12 UNILATERAL PRIMARY OSTEOARTHRITIS, LEFT KNEE: ICD-10-CM

## 2025-08-13 PROCEDURE — 20611 DRAIN/INJ JOINT/BURSA W/US: CPT | Mod: 79,LT

## 2025-08-13 RX ORDER — HYALURONATE SODIUM 20 MG/2 ML
20 SYRINGE (ML) INTRAARTICULAR
Refills: 0 | Status: COMPLETED | OUTPATIENT
Start: 2025-08-13

## 2025-08-13 RX ADMIN — Medication 2 MG/2ML: at 00:00

## 2025-08-31 ENCOUNTER — EMERGENCY (EMERGENCY)
Facility: HOSPITAL | Age: 30
LOS: 1 days | End: 2025-08-31
Attending: EMERGENCY MEDICINE
Payer: COMMERCIAL

## 2025-08-31 VITALS
OXYGEN SATURATION: 99 % | DIASTOLIC BLOOD PRESSURE: 62 MMHG | RESPIRATION RATE: 16 BRPM | HEART RATE: 69 BPM | SYSTOLIC BLOOD PRESSURE: 127 MMHG | TEMPERATURE: 98 F

## 2025-08-31 VITALS
RESPIRATION RATE: 18 BRPM | OXYGEN SATURATION: 100 % | TEMPERATURE: 98 F | HEART RATE: 72 BPM | SYSTOLIC BLOOD PRESSURE: 116 MMHG | WEIGHT: 149.91 LBS | DIASTOLIC BLOOD PRESSURE: 77 MMHG | HEIGHT: 61 IN

## 2025-08-31 DIAGNOSIS — Z98.890 OTHER SPECIFIED POSTPROCEDURAL STATES: Chronic | ICD-10-CM

## 2025-08-31 LAB — HCG UR QL: NEGATIVE — SIGNIFICANT CHANGE UP

## 2025-08-31 PROCEDURE — 99283 EMERGENCY DEPT VISIT LOW MDM: CPT

## 2025-08-31 PROCEDURE — 72110 X-RAY EXAM L-2 SPINE 4/>VWS: CPT

## 2025-08-31 PROCEDURE — 99284 EMERGENCY DEPT VISIT MOD MDM: CPT

## 2025-08-31 PROCEDURE — 81025 URINE PREGNANCY TEST: CPT

## 2025-08-31 PROCEDURE — 72110 X-RAY EXAM L-2 SPINE 4/>VWS: CPT | Mod: 26

## 2025-08-31 RX ORDER — IBUPROFEN 200 MG
600 TABLET ORAL ONCE
Refills: 0 | Status: COMPLETED | OUTPATIENT
Start: 2025-08-31 | End: 2025-08-31

## 2025-08-31 RX ORDER — LIDOCAINE HYDROCHLORIDE 20 MG/ML
1 JELLY TOPICAL ONCE
Refills: 0 | Status: COMPLETED | OUTPATIENT
Start: 2025-08-31 | End: 2025-08-31

## 2025-08-31 RX ADMIN — Medication 600 MILLIGRAM(S): at 10:43

## 2025-08-31 RX ADMIN — LIDOCAINE HYDROCHLORIDE 1 PATCH: 20 JELLY TOPICAL at 10:42

## 2025-09-02 ENCOUNTER — NON-APPOINTMENT (OUTPATIENT)
Age: 30
End: 2025-09-02

## 2025-09-03 ENCOUNTER — APPOINTMENT (OUTPATIENT)
Dept: ORTHOPEDIC SURGERY | Facility: CLINIC | Age: 30
End: 2025-09-03
Payer: COMMERCIAL

## 2025-09-03 VITALS — HEIGHT: 61 IN | BODY MASS INDEX: 28.32 KG/M2 | WEIGHT: 150 LBS

## 2025-09-03 DIAGNOSIS — M54.16 RADICULOPATHY, LUMBAR REGION: ICD-10-CM

## 2025-09-03 DIAGNOSIS — M51.369: ICD-10-CM

## 2025-09-03 PROCEDURE — 99214 OFFICE O/P EST MOD 30 MIN: CPT

## 2025-09-15 ENCOUNTER — APPOINTMENT (OUTPATIENT)
Dept: ORTHOPEDIC SURGERY | Facility: CLINIC | Age: 30
End: 2025-09-15

## (undated) DEVICE — POSITIONER FOAM HEAD CRADLE (PINK)

## (undated) DEVICE — SUT MONOSOF 4-0 18" C-13

## (undated) DEVICE — SPECIMEN CONTAINER PET

## (undated) DEVICE — VENODYNE/SCD SLEEVE CALF MEDIUM

## (undated) DEVICE — PACK UPPER EXTREMITY

## (undated) DEVICE — SOLIDIFIER CANN EXPRESS 3K

## (undated) DEVICE — GLV 8 PROTEXIS (WHITE)

## (undated) DEVICE — WARMING BLANKET LOWER ADULT

## (undated) DEVICE — DRSG WEBRIL 4"

## (undated) DEVICE — DRSG CURITY GAUZE SPONGE 4 X 4" 12-PLY

## (undated) DEVICE — DRAPE 3/4 SHEET 52X76"

## (undated) DEVICE — TOURNIQUET CUFF 18" DUAL PORT SINGLE BLADDER W PLC  (BLACK)

## (undated) DEVICE — DRSG STOCKINETTE CLOTH 4 X 36"

## (undated) DEVICE — SOL IRR POUR H2O 1500ML

## (undated) DEVICE — SLING ARM CHIEFTAIN MESH LARGE

## (undated) DEVICE — PREP CHLORAPREP HI-LITE ORANGE 26ML

## (undated) DEVICE — SOL IRR POUR NS 0.9% 500ML

## (undated) DEVICE — SUT BIOSYN 5-0 18" P-13

## (undated) DEVICE — WARMING BLANKET UPPER ADULT

## (undated) DEVICE — GLV 7.5 PROTEXIS (WHITE)

## (undated) DEVICE — POSITIONER STRAP ARMBOARD VELCRO TS-30